# Patient Record
Sex: MALE | Race: WHITE | Employment: OTHER | URBAN - NONMETROPOLITAN AREA
[De-identification: names, ages, dates, MRNs, and addresses within clinical notes are randomized per-mention and may not be internally consistent; named-entity substitution may affect disease eponyms.]

---

## 2020-01-01 ENCOUNTER — HOSPITAL ENCOUNTER (INPATIENT)
Age: 65
LOS: 7 days | Discharge: HOME | DRG: 871 | End: 2020-02-08
Attending: EMERGENCY MEDICINE | Admitting: FAMILY MEDICINE
Payer: MEDICAID

## 2020-01-01 ENCOUNTER — TELEPHONE (OUTPATIENT)
Dept: FAMILY MEDICINE CLINIC | Age: 65
End: 2020-01-01

## 2020-01-01 ENCOUNTER — TELEPHONE (OUTPATIENT)
Dept: CARDIOLOGY | Age: 65
End: 2020-01-01

## 2020-01-01 ENCOUNTER — TELEPHONE (OUTPATIENT)
Dept: ADMINISTRATIVE | Age: 65
End: 2020-01-01

## 2020-01-01 ENCOUNTER — APPOINTMENT (OUTPATIENT)
Dept: GENERAL RADIOLOGY | Age: 65
DRG: 871 | End: 2020-01-01
Payer: MEDICAID

## 2020-01-01 VITALS
RESPIRATION RATE: 21 BRPM | HEIGHT: 73 IN | SYSTOLIC BLOOD PRESSURE: 133 MMHG | DIASTOLIC BLOOD PRESSURE: 79 MMHG | BODY MASS INDEX: 16.33 KG/M2 | TEMPERATURE: 97 F | OXYGEN SATURATION: 99 % | WEIGHT: 123.19 LBS | HEART RATE: 106 BPM

## 2020-01-01 LAB
ALBUMIN SERPL-MCNC: 3.2 G/DL (ref 3.5–5.2)
ALP BLD-CCNC: 125 U/L (ref 40–130)
ALT SERPL-CCNC: 43 U/L (ref 5–41)
ANION GAP SERPL CALCULATED.3IONS-SCNC: 10 MMOL/L (ref 7–19)
ANION GAP SERPL CALCULATED.3IONS-SCNC: 11 MMOL/L (ref 7–19)
ANION GAP SERPL CALCULATED.3IONS-SCNC: 12 MMOL/L (ref 7–19)
ANION GAP SERPL CALCULATED.3IONS-SCNC: 13 MMOL/L (ref 7–19)
ANION GAP SERPL CALCULATED.3IONS-SCNC: 15 MMOL/L (ref 7–19)
ANION GAP SERPL CALCULATED.3IONS-SCNC: 15 MMOL/L (ref 7–19)
ANION GAP SERPL CALCULATED.3IONS-SCNC: 17 MMOL/L (ref 7–19)
ANION GAP SERPL CALCULATED.3IONS-SCNC: 17 MMOL/L (ref 7–19)
ANISOCYTOSIS: ABNORMAL
AST SERPL-CCNC: 29 U/L (ref 5–40)
BANDED NEUTROPHILS RELATIVE PERCENT: 2 % (ref 0–5)
BASE EXCESS ARTERIAL: 3.5 MMOL/L (ref -2–2)
BASE EXCESS ARTERIAL: 4.3 MMOL/L (ref -2–2)
BASOPHILS ABSOLUTE: 0 K/UL (ref 0–0.2)
BASOPHILS ABSOLUTE: 0.2 K/UL (ref 0–0.2)
BASOPHILS RELATIVE PERCENT: 0 % (ref 0–1)
BASOPHILS RELATIVE PERCENT: 0.9 % (ref 0–1)
BILIRUB SERPL-MCNC: 0.8 MG/DL (ref 0.2–1.2)
BILIRUBIN URINE: NEGATIVE
BLOOD CULTURE, ROUTINE: NORMAL
BLOOD CULTURE, ROUTINE: NORMAL
BLOOD, URINE: NEGATIVE
BUN BLDV-MCNC: 12 MG/DL (ref 8–23)
BUN BLDV-MCNC: 13 MG/DL (ref 8–23)
BUN BLDV-MCNC: 17 MG/DL (ref 8–23)
BUN BLDV-MCNC: 19 MG/DL (ref 8–23)
BUN BLDV-MCNC: 20 MG/DL (ref 8–23)
BUN BLDV-MCNC: 21 MG/DL (ref 8–23)
CALCIUM SERPL-MCNC: 8 MG/DL (ref 8.8–10.2)
CALCIUM SERPL-MCNC: 8.3 MG/DL (ref 8.8–10.2)
CALCIUM SERPL-MCNC: 8.4 MG/DL (ref 8.8–10.2)
CALCIUM SERPL-MCNC: 8.5 MG/DL (ref 8.8–10.2)
CALCIUM SERPL-MCNC: 8.8 MG/DL (ref 8.8–10.2)
CALCIUM SERPL-MCNC: 9.2 MG/DL (ref 8.8–10.2)
CARBOXYHEMOGLOBIN ARTERIAL: 2.5 % (ref 0–5)
CARBOXYHEMOGLOBIN ARTERIAL: 2.8 % (ref 0–5)
CHLORIDE BLD-SCNC: 101 MMOL/L (ref 98–111)
CHLORIDE BLD-SCNC: 102 MMOL/L (ref 98–111)
CHLORIDE BLD-SCNC: 103 MMOL/L (ref 98–111)
CHLORIDE BLD-SCNC: 104 MMOL/L (ref 98–111)
CHLORIDE BLD-SCNC: 95 MMOL/L (ref 98–111)
CHLORIDE BLD-SCNC: 95 MMOL/L (ref 98–111)
CLARITY: CLEAR
CO2: 24 MMOL/L (ref 22–29)
CO2: 25 MMOL/L (ref 22–29)
CO2: 27 MMOL/L (ref 22–29)
CO2: 29 MMOL/L (ref 22–29)
CO2: 30 MMOL/L (ref 22–29)
CO2: 31 MMOL/L (ref 22–29)
COLOR: YELLOW
CREAT SERPL-MCNC: 0.5 MG/DL (ref 0.5–1.2)
CREAT SERPL-MCNC: <0.5 MG/DL (ref 0.5–1.2)
CULTURE, BLOOD 2: ABNORMAL
CULTURE, BLOOD 2: ABNORMAL
CULTURE, BLOOD 2: NORMAL
EKG P AXIS: 90 DEGREES
EKG P AXIS: 95 DEGREES
EKG P-R INTERVAL: 158 MS
EKG P-R INTERVAL: 158 MS
EKG Q-T INTERVAL: 298 MS
EKG Q-T INTERVAL: 316 MS
EKG QRS DURATION: 86 MS
EKG QRS DURATION: 90 MS
EKG QTC CALCULATION (BAZETT): 362 MS
EKG QTC CALCULATION (BAZETT): 386 MS
EKG T AXIS: 131 DEGREES
EKG T AXIS: 92 DEGREES
EOSINOPHILS ABSOLUTE: 0 K/UL (ref 0–0.6)
EOSINOPHILS ABSOLUTE: 0 K/UL (ref 0–0.6)
EOSINOPHILS RELATIVE PERCENT: 0 % (ref 0–5)
EOSINOPHILS RELATIVE PERCENT: 0.1 % (ref 0–5)
GFR NON-AFRICAN AMERICAN: >60
GLUCOSE BLD-MCNC: 106 MG/DL (ref 74–109)
GLUCOSE BLD-MCNC: 134 MG/DL (ref 74–109)
GLUCOSE BLD-MCNC: 135 MG/DL (ref 74–109)
GLUCOSE BLD-MCNC: 141 MG/DL (ref 74–109)
GLUCOSE BLD-MCNC: 141 MG/DL (ref 74–109)
GLUCOSE BLD-MCNC: 160 MG/DL (ref 74–109)
GLUCOSE BLD-MCNC: 172 MG/DL (ref 74–109)
GLUCOSE BLD-MCNC: 236 MG/DL (ref 70–99)
GLUCOSE BLD-MCNC: 337 MG/DL (ref 74–109)
GLUCOSE URINE: NEGATIVE MG/DL
HCO3 ARTERIAL: 27.1 MMOL/L (ref 22–26)
HCO3 ARTERIAL: 28.5 MMOL/L (ref 22–26)
HCT VFR BLD CALC: 33.4 % (ref 42–52)
HCT VFR BLD CALC: 33.6 % (ref 42–52)
HCT VFR BLD CALC: 35.1 % (ref 42–52)
HCT VFR BLD CALC: 35.1 % (ref 42–52)
HCT VFR BLD CALC: 35.4 % (ref 42–52)
HCT VFR BLD CALC: 35.7 % (ref 42–52)
HCT VFR BLD CALC: 40.7 % (ref 42–52)
HCT VFR BLD CALC: 41.8 % (ref 42–52)
HEMOGLOBIN, ART, EXTENDED: 12.9 G/DL (ref 14–18)
HEMOGLOBIN, ART, EXTENDED: 14 G/DL (ref 14–18)
HEMOGLOBIN: 10.6 G/DL (ref 14–18)
HEMOGLOBIN: 10.9 G/DL (ref 14–18)
HEMOGLOBIN: 11.1 G/DL (ref 14–18)
HEMOGLOBIN: 11.1 G/DL (ref 14–18)
HEMOGLOBIN: 11.3 G/DL (ref 14–18)
HEMOGLOBIN: 11.3 G/DL (ref 14–18)
HEMOGLOBIN: 12.5 G/DL (ref 14–18)
HEMOGLOBIN: 13.3 G/DL (ref 14–18)
IMMATURE GRANULOCYTES #: 1.9 K/UL
IMMATURE GRANULOCYTES #: 2.3 K/UL
KETONES, URINE: NEGATIVE MG/DL
LACTIC ACID, SEPSIS: 2 MG/DL (ref 0.5–1.9)
LACTIC ACID, SEPSIS: 4.5 MG/DL (ref 0.5–1.9)
LACTIC ACID: 1.8 MMOL/L (ref 0.5–1.9)
LACTIC ACID: 2.2 MMOL/L (ref 0.5–1.9)
LEUKOCYTE ESTERASE, URINE: NEGATIVE
LV EF: 53 %
LVEF MODALITY: NORMAL
LYMPHOCYTES ABSOLUTE: 0.8 K/UL (ref 1.1–4.5)
LYMPHOCYTES ABSOLUTE: 3.4 K/UL (ref 1.1–4.5)
LYMPHOCYTES RELATIVE PERCENT: 14 % (ref 20–40)
LYMPHOCYTES RELATIVE PERCENT: 4.1 % (ref 20–40)
MAGNESIUM: 1.9 MG/DL (ref 1.6–2.4)
MCH RBC QN AUTO: 29.4 PG (ref 27–31)
MCH RBC QN AUTO: 29.5 PG (ref 27–31)
MCH RBC QN AUTO: 29.7 PG (ref 27–31)
MCH RBC QN AUTO: 29.8 PG (ref 27–31)
MCH RBC QN AUTO: 29.9 PG (ref 27–31)
MCH RBC QN AUTO: 30.1 PG (ref 27–31)
MCHC RBC AUTO-ENTMCNC: 30.7 G/DL (ref 33–37)
MCHC RBC AUTO-ENTMCNC: 31.1 G/DL (ref 33–37)
MCHC RBC AUTO-ENTMCNC: 31.6 G/DL (ref 33–37)
MCHC RBC AUTO-ENTMCNC: 31.7 G/DL (ref 33–37)
MCHC RBC AUTO-ENTMCNC: 31.8 G/DL (ref 33–37)
MCHC RBC AUTO-ENTMCNC: 31.9 G/DL (ref 33–37)
MCHC RBC AUTO-ENTMCNC: 32.2 G/DL (ref 33–37)
MCHC RBC AUTO-ENTMCNC: 32.4 G/DL (ref 33–37)
MCV RBC AUTO: 92.3 FL (ref 80–94)
MCV RBC AUTO: 93 FL (ref 80–94)
MCV RBC AUTO: 93.4 FL (ref 80–94)
MCV RBC AUTO: 93.7 FL (ref 80–94)
MCV RBC AUTO: 93.9 FL (ref 80–94)
MCV RBC AUTO: 93.9 FL (ref 80–94)
MCV RBC AUTO: 95.8 FL (ref 80–94)
MCV RBC AUTO: 96 FL (ref 80–94)
METHEMOGLOBIN ARTERIAL: 1.1 %
METHEMOGLOBIN ARTERIAL: 1.6 %
MONOCYTES ABSOLUTE: 0.7 K/UL (ref 0–0.9)
MONOCYTES ABSOLUTE: 1.7 K/UL (ref 0–0.9)
MONOCYTES RELATIVE PERCENT: 3.6 % (ref 0–10)
MONOCYTES RELATIVE PERCENT: 7 % (ref 0–10)
MYELOCYTE PERCENT: 2 %
NEUTROPHILS ABSOLUTE: 16.1 K/UL (ref 1.5–7.5)
NEUTROPHILS ABSOLUTE: 19.2 K/UL (ref 1.5–7.5)
NEUTROPHILS RELATIVE PERCENT: 75 % (ref 50–65)
NEUTROPHILS RELATIVE PERCENT: 81.7 % (ref 50–65)
NITRITE, URINE: NEGATIVE
O2 CONTENT ARTERIAL: 17.7 ML/DL
O2 CONTENT ARTERIAL: 19.2 ML/DL
O2 SAT, ARTERIAL: 96.1 %
O2 SAT, ARTERIAL: 96.3 %
O2 THERAPY: ABNORMAL
O2 THERAPY: ABNORMAL
ORGANISM: ABNORMAL
OVALOCYTES: ABNORMAL
PCO2 ARTERIAL: 34 MMHG (ref 35–45)
PCO2 ARTERIAL: 44 MMHG (ref 35–45)
PDW BLD-RTO: 15.2 % (ref 11.5–14.5)
PDW BLD-RTO: 15.4 % (ref 11.5–14.5)
PDW BLD-RTO: 15.7 % (ref 11.5–14.5)
PDW BLD-RTO: 15.7 % (ref 11.5–14.5)
PDW BLD-RTO: 15.9 % (ref 11.5–14.5)
PDW BLD-RTO: 15.9 % (ref 11.5–14.5)
PDW BLD-RTO: 16.2 % (ref 11.5–14.5)
PDW BLD-RTO: 16.5 % (ref 11.5–14.5)
PERFORMED ON: ABNORMAL
PH ARTERIAL: 7.42 (ref 7.35–7.45)
PH ARTERIAL: 7.51 (ref 7.35–7.45)
PH UA: 6 (ref 5–8)
PLATELET # BLD: 185 K/UL (ref 130–400)
PLATELET # BLD: 206 K/UL (ref 130–400)
PLATELET # BLD: 220 K/UL (ref 130–400)
PLATELET # BLD: 225 K/UL (ref 130–400)
PLATELET # BLD: 233 K/UL (ref 130–400)
PLATELET # BLD: 233 K/UL (ref 130–400)
PLATELET # BLD: 283 K/UL (ref 130–400)
PLATELET # BLD: 360 K/UL (ref 130–400)
PLATELET SLIDE REVIEW: ABNORMAL
PMV BLD AUTO: 10 FL (ref 9.4–12.4)
PMV BLD AUTO: 10 FL (ref 9.4–12.4)
PMV BLD AUTO: 10.1 FL (ref 9.4–12.4)
PMV BLD AUTO: 10.2 FL (ref 9.4–12.4)
PMV BLD AUTO: 9.5 FL (ref 9.4–12.4)
PMV BLD AUTO: 9.9 FL (ref 9.4–12.4)
PO2 ARTERIAL: 123 MMHG (ref 80–100)
PO2 ARTERIAL: 158 MMHG (ref 80–100)
POTASSIUM REFLEX MAGNESIUM: 4.7 MMOL/L (ref 3.5–5)
POTASSIUM REFLEX MAGNESIUM: 4.9 MMOL/L (ref 3.5–5)
POTASSIUM SERPL-SCNC: 2.9 MMOL/L (ref 3.5–5)
POTASSIUM SERPL-SCNC: 3.3 MMOL/L (ref 3.5–5)
POTASSIUM SERPL-SCNC: 3.5 MMOL/L (ref 3.5–5)
POTASSIUM SERPL-SCNC: 3.6 MMOL/L (ref 3.5–5)
POTASSIUM SERPL-SCNC: 3.9 MMOL/L (ref 3.5–5)
POTASSIUM SERPL-SCNC: 4.1 MMOL/L (ref 3.5–5)
POTASSIUM SERPL-SCNC: 4.4 MMOL/L (ref 3.5–5)
POTASSIUM, WHOLE BLOOD: 3.3
POTASSIUM, WHOLE BLOOD: 4.6
PROTEIN UA: NEGATIVE MG/DL
RBC # BLD: 3.59 M/UL (ref 4.7–6.1)
RBC # BLD: 3.64 M/UL (ref 4.7–6.1)
RBC # BLD: 3.72 M/UL (ref 4.7–6.1)
RBC # BLD: 3.74 M/UL (ref 4.7–6.1)
RBC # BLD: 3.76 M/UL (ref 4.7–6.1)
RBC # BLD: 3.78 M/UL (ref 4.7–6.1)
RBC # BLD: 4.25 M/UL (ref 4.7–6.1)
RBC # BLD: 4.45 M/UL (ref 4.7–6.1)
SODIUM BLD-SCNC: 135 MMOL/L (ref 136–145)
SODIUM BLD-SCNC: 139 MMOL/L (ref 136–145)
SODIUM BLD-SCNC: 141 MMOL/L (ref 136–145)
SODIUM BLD-SCNC: 143 MMOL/L (ref 136–145)
SODIUM BLD-SCNC: 145 MMOL/L (ref 136–145)
SPECIFIC GRAVITY UA: 1.02 (ref 1–1.03)
TOTAL PROTEIN: 6.7 G/DL (ref 6.6–8.7)
TROPONIN: <0.01 NG/ML (ref 0–0.03)
UROBILINOGEN, URINE: 4 E.U./DL
WBC # BLD: 17 K/UL (ref 4.8–10.8)
WBC # BLD: 17.6 K/UL (ref 4.8–10.8)
WBC # BLD: 17.9 K/UL (ref 4.8–10.8)
WBC # BLD: 18.1 K/UL (ref 4.8–10.8)
WBC # BLD: 19.8 K/UL (ref 4.8–10.8)
WBC # BLD: 19.9 K/UL (ref 4.8–10.8)
WBC # BLD: 20.1 K/UL (ref 4.8–10.8)
WBC # BLD: 24.3 K/UL (ref 4.8–10.8)

## 2020-01-01 PROCEDURE — 84132 ASSAY OF SERUM POTASSIUM: CPT

## 2020-01-01 PROCEDURE — 6370000000 HC RX 637 (ALT 250 FOR IP): Performed by: NURSE PRACTITIONER

## 2020-01-01 PROCEDURE — 2700000000 HC OXYGEN THERAPY PER DAY

## 2020-01-01 PROCEDURE — 2500000003 HC RX 250 WO HCPCS: Performed by: HOSPITALIST

## 2020-01-01 PROCEDURE — 6360000002 HC RX W HCPCS: Performed by: INTERNAL MEDICINE

## 2020-01-01 PROCEDURE — 82803 BLOOD GASES ANY COMBINATION: CPT

## 2020-01-01 PROCEDURE — 94640 AIRWAY INHALATION TREATMENT: CPT

## 2020-01-01 PROCEDURE — 6360000002 HC RX W HCPCS: Performed by: HOSPITALIST

## 2020-01-01 PROCEDURE — 99999 PR OFFICE/OUTPT VISIT,PROCEDURE ONLY: CPT | Performed by: EMERGENCY MEDICINE

## 2020-01-01 PROCEDURE — 6370000000 HC RX 637 (ALT 250 FOR IP): Performed by: FAMILY MEDICINE

## 2020-01-01 PROCEDURE — 6360000002 HC RX W HCPCS: Performed by: NURSE PRACTITIONER

## 2020-01-01 PROCEDURE — 6370000000 HC RX 637 (ALT 250 FOR IP): Performed by: INTERNAL MEDICINE

## 2020-01-01 PROCEDURE — 2580000003 HC RX 258: Performed by: INTERNAL MEDICINE

## 2020-01-01 PROCEDURE — 93005 ELECTROCARDIOGRAM TRACING: CPT | Performed by: EMERGENCY MEDICINE

## 2020-01-01 PROCEDURE — 85025 COMPLETE CBC W/AUTO DIFF WBC: CPT

## 2020-01-01 PROCEDURE — 6370000000 HC RX 637 (ALT 250 FOR IP): Performed by: HOSPITALIST

## 2020-01-01 PROCEDURE — 2580000003 HC RX 258: Performed by: HOSPITALIST

## 2020-01-01 PROCEDURE — 6370000000 HC RX 637 (ALT 250 FOR IP): Performed by: EMERGENCY MEDICINE

## 2020-01-01 PROCEDURE — 82948 REAGENT STRIP/BLOOD GLUCOSE: CPT

## 2020-01-01 PROCEDURE — 97530 THERAPEUTIC ACTIVITIES: CPT

## 2020-01-01 PROCEDURE — 93005 ELECTROCARDIOGRAM TRACING: CPT | Performed by: HOSPITALIST

## 2020-01-01 PROCEDURE — 36600 WITHDRAWAL OF ARTERIAL BLOOD: CPT

## 2020-01-01 PROCEDURE — 93010 ELECTROCARDIOGRAM REPORT: CPT | Performed by: INTERNAL MEDICINE

## 2020-01-01 PROCEDURE — 1210000000 HC MED SURG R&B

## 2020-01-01 PROCEDURE — 36415 COLL VENOUS BLD VENIPUNCTURE: CPT

## 2020-01-01 PROCEDURE — 87040 BLOOD CULTURE FOR BACTERIA: CPT

## 2020-01-01 PROCEDURE — 2580000003 HC RX 258: Performed by: EMERGENCY MEDICINE

## 2020-01-01 PROCEDURE — 97162 PT EVAL MOD COMPLEX 30 MIN: CPT

## 2020-01-01 PROCEDURE — 80048 BASIC METABOLIC PNL TOTAL CA: CPT

## 2020-01-01 PROCEDURE — 6360000002 HC RX W HCPCS: Performed by: EMERGENCY MEDICINE

## 2020-01-01 PROCEDURE — 99285 EMERGENCY DEPT VISIT HI MDM: CPT

## 2020-01-01 PROCEDURE — 85027 COMPLETE CBC AUTOMATED: CPT

## 2020-01-01 PROCEDURE — 93306 TTE W/DOPPLER COMPLETE: CPT

## 2020-01-01 PROCEDURE — 81003 URINALYSIS AUTO W/O SCOPE: CPT

## 2020-01-01 PROCEDURE — 84484 ASSAY OF TROPONIN QUANT: CPT

## 2020-01-01 PROCEDURE — 83605 ASSAY OF LACTIC ACID: CPT

## 2020-01-01 PROCEDURE — 99223 1ST HOSP IP/OBS HIGH 75: CPT | Performed by: INTERNAL MEDICINE

## 2020-01-01 PROCEDURE — 71045 X-RAY EXAM CHEST 1 VIEW: CPT

## 2020-01-01 PROCEDURE — 80053 COMPREHEN METABOLIC PANEL: CPT

## 2020-01-01 PROCEDURE — 96374 THER/PROPH/DIAG INJ IV PUSH: CPT

## 2020-01-01 PROCEDURE — 96375 TX/PRO/DX INJ NEW DRUG ADDON: CPT

## 2020-01-01 PROCEDURE — 99232 SBSQ HOSP IP/OBS MODERATE 35: CPT | Performed by: INTERNAL MEDICINE

## 2020-01-01 PROCEDURE — 99233 SBSQ HOSP IP/OBS HIGH 50: CPT | Performed by: INTERNAL MEDICINE

## 2020-01-01 PROCEDURE — 83735 ASSAY OF MAGNESIUM: CPT

## 2020-01-01 RX ORDER — POTASSIUM CHLORIDE 20 MEQ/1
40 TABLET, EXTENDED RELEASE ORAL PRN
Status: DISCONTINUED | OUTPATIENT
Start: 2020-01-01 | End: 2020-01-01 | Stop reason: HOSPADM

## 2020-01-01 RX ORDER — MIDAZOLAM HYDROCHLORIDE 1 MG/ML
INJECTION INTRAMUSCULAR; INTRAVENOUS
Status: DISPENSED
Start: 2020-01-01 | End: 2020-01-01

## 2020-01-01 RX ORDER — OMEPRAZOLE 20 MG/1
CAPSULE, DELAYED RELEASE ORAL
COMMUNITY

## 2020-01-01 RX ORDER — SODIUM CHLORIDE 0.9 % (FLUSH) 0.9 %
10 SYRINGE (ML) INJECTION PRN
Status: DISCONTINUED | OUTPATIENT
Start: 2020-01-01 | End: 2020-01-01 | Stop reason: HOSPADM

## 2020-01-01 RX ORDER — PREDNISONE 20 MG/1
TABLET ORAL
Qty: 35 TABLET | Refills: 0 | Status: SHIPPED | OUTPATIENT
Start: 2020-01-01 | End: 2020-01-01

## 2020-01-01 RX ORDER — LISINOPRIL 10 MG/1
10 TABLET ORAL DAILY
Qty: 30 TABLET | Refills: 0 | Status: SHIPPED | OUTPATIENT
Start: 2020-01-01

## 2020-01-01 RX ORDER — TRAMADOL HYDROCHLORIDE 50 MG/1
50 TABLET ORAL EVERY 6 HOURS PRN
Qty: 12 TABLET | Refills: 0 | Status: SHIPPED | OUTPATIENT
Start: 2020-01-01 | End: 2020-01-01

## 2020-01-01 RX ORDER — IPRATROPIUM BROMIDE AND ALBUTEROL SULFATE 2.5; .5 MG/3ML; MG/3ML
1 SOLUTION RESPIRATORY (INHALATION) ONCE
Status: COMPLETED | OUTPATIENT
Start: 2020-01-01 | End: 2020-01-01

## 2020-01-01 RX ORDER — METHYLPREDNISOLONE SODIUM SUCCINATE 125 MG/2ML
80 INJECTION, POWDER, LYOPHILIZED, FOR SOLUTION INTRAMUSCULAR; INTRAVENOUS EVERY 8 HOURS
Status: DISCONTINUED | OUTPATIENT
Start: 2020-01-01 | End: 2020-01-01

## 2020-01-01 RX ORDER — DOCUSATE SODIUM 100 MG/1
100 CAPSULE, LIQUID FILLED ORAL DAILY
Status: DISCONTINUED | OUTPATIENT
Start: 2020-01-01 | End: 2020-01-01 | Stop reason: HOSPADM

## 2020-01-01 RX ORDER — METHYLPREDNISOLONE SODIUM SUCCINATE 40 MG/ML
40 INJECTION, POWDER, LYOPHILIZED, FOR SOLUTION INTRAMUSCULAR; INTRAVENOUS EVERY 12 HOURS
Status: DISCONTINUED | OUTPATIENT
Start: 2020-01-01 | End: 2020-01-01 | Stop reason: HOSPADM

## 2020-01-01 RX ORDER — SODIUM CHLORIDE 0.9 % (FLUSH) 0.9 %
10 SYRINGE (ML) INJECTION PRN
Status: DISCONTINUED | OUTPATIENT
Start: 2020-01-01 | End: 2020-01-01

## 2020-01-01 RX ORDER — DILTIAZEM HYDROCHLORIDE 300 MG/1
CAPSULE, COATED, EXTENDED RELEASE ORAL
Status: ON HOLD | COMMUNITY
End: 2020-01-01 | Stop reason: HOSPADM

## 2020-01-01 RX ORDER — PANTOPRAZOLE SODIUM 40 MG/1
40 TABLET, DELAYED RELEASE ORAL
Status: DISCONTINUED | OUTPATIENT
Start: 2020-01-01 | End: 2020-01-01 | Stop reason: HOSPADM

## 2020-01-01 RX ORDER — TRAMADOL HYDROCHLORIDE 50 MG/1
50 TABLET ORAL EVERY 6 HOURS PRN
Status: DISCONTINUED | OUTPATIENT
Start: 2020-01-01 | End: 2020-01-01 | Stop reason: HOSPADM

## 2020-01-01 RX ORDER — CHLORPHENIRAMINE MALEATE 4 MG/1
TABLET ORAL
Status: ON HOLD | COMMUNITY
End: 2020-01-01 | Stop reason: HOSPADM

## 2020-01-01 RX ORDER — POTASSIUM CHLORIDE 7.45 MG/ML
10 INJECTION INTRAVENOUS PRN
Status: DISCONTINUED | OUTPATIENT
Start: 2020-01-01 | End: 2020-01-01 | Stop reason: HOSPADM

## 2020-01-01 RX ORDER — TRAMADOL HYDROCHLORIDE 50 MG/1
50 TABLET ORAL ONCE
Status: COMPLETED | OUTPATIENT
Start: 2020-01-01 | End: 2020-01-01

## 2020-01-01 RX ORDER — PREDNISONE 20 MG/1
40 TABLET ORAL DAILY
Status: DISCONTINUED | OUTPATIENT
Start: 2020-01-01 | End: 2020-01-01

## 2020-01-01 RX ORDER — HYDROXYZINE HYDROCHLORIDE 25 MG/1
25 TABLET, FILM COATED ORAL 3 TIMES DAILY
Qty: 90 TABLET | Refills: 0 | Status: SHIPPED | OUTPATIENT
Start: 2020-01-01 | End: 2020-01-01

## 2020-01-01 RX ORDER — MONTELUKAST SODIUM 10 MG/1
10 TABLET ORAL NIGHTLY
Status: DISCONTINUED | OUTPATIENT
Start: 2020-01-01 | End: 2020-01-01 | Stop reason: HOSPADM

## 2020-01-01 RX ORDER — HYDROXYZINE HYDROCHLORIDE 25 MG/1
25 TABLET, FILM COATED ORAL 3 TIMES DAILY
Status: DISCONTINUED | OUTPATIENT
Start: 2020-01-01 | End: 2020-01-01 | Stop reason: HOSPADM

## 2020-01-01 RX ORDER — IPRATROPIUM BROMIDE AND ALBUTEROL SULFATE 2.5; .5 MG/3ML; MG/3ML
1 SOLUTION RESPIRATORY (INHALATION)
Status: DISCONTINUED | OUTPATIENT
Start: 2020-01-01 | End: 2020-01-01 | Stop reason: HOSPADM

## 2020-01-01 RX ORDER — ONDANSETRON 2 MG/ML
4 INJECTION INTRAMUSCULAR; INTRAVENOUS EVERY 6 HOURS PRN
Status: DISCONTINUED | OUTPATIENT
Start: 2020-01-01 | End: 2020-01-01 | Stop reason: HOSPADM

## 2020-01-01 RX ORDER — LORAZEPAM 2 MG/ML
0.5 INJECTION INTRAMUSCULAR EVERY 6 HOURS PRN
Status: DISCONTINUED | OUTPATIENT
Start: 2020-01-01 | End: 2020-01-01 | Stop reason: HOSPADM

## 2020-01-01 RX ORDER — SODIUM CHLORIDE 0.9 % (FLUSH) 0.9 %
10 SYRINGE (ML) INJECTION EVERY 12 HOURS SCHEDULED
Status: DISCONTINUED | OUTPATIENT
Start: 2020-01-01 | End: 2020-01-01

## 2020-01-01 RX ORDER — METHYLPREDNISOLONE SODIUM SUCCINATE 125 MG/2ML
80 INJECTION, POWDER, LYOPHILIZED, FOR SOLUTION INTRAMUSCULAR; INTRAVENOUS ONCE
Status: COMPLETED | OUTPATIENT
Start: 2020-01-01 | End: 2020-01-01

## 2020-01-01 RX ORDER — LISINOPRIL 10 MG/1
10 TABLET ORAL DAILY
Status: DISCONTINUED | OUTPATIENT
Start: 2020-01-01 | End: 2020-01-01 | Stop reason: HOSPADM

## 2020-01-01 RX ORDER — IPRATROPIUM BROMIDE AND ALBUTEROL SULFATE 2.5; .5 MG/3ML; MG/3ML
1 SOLUTION RESPIRATORY (INHALATION) EVERY 4 HOURS PRN
Status: DISCONTINUED | OUTPATIENT
Start: 2020-01-01 | End: 2020-01-01 | Stop reason: HOSPADM

## 2020-01-01 RX ORDER — MONTELUKAST SODIUM 10 MG/1
TABLET ORAL
COMMUNITY

## 2020-01-01 RX ORDER — KETOROLAC TROMETHAMINE 30 MG/ML
30 INJECTION, SOLUTION INTRAMUSCULAR; INTRAVENOUS EVERY 6 HOURS PRN
Status: DISPENSED | OUTPATIENT
Start: 2020-01-01 | End: 2020-01-01

## 2020-01-01 RX ORDER — ACETAMINOPHEN 325 MG/1
650 TABLET ORAL EVERY 6 HOURS PRN
Status: DISCONTINUED | OUTPATIENT
Start: 2020-01-01 | End: 2020-01-01

## 2020-01-01 RX ORDER — GUAIFENESIN AND DEXTROMETHORPHAN HYDROBROMIDE 1200; 60 MG/1; MG/1
TABLET, EXTENDED RELEASE ORAL
Status: ON HOLD | COMMUNITY
End: 2020-01-01 | Stop reason: HOSPADM

## 2020-01-01 RX ORDER — DOCUSATE SODIUM 100 MG/1
100 CAPSULE, LIQUID FILLED ORAL DAILY
COMMUNITY

## 2020-01-01 RX ORDER — IPRATROPIUM BROMIDE AND ALBUTEROL SULFATE 2.5; .5 MG/3ML; MG/3ML
SOLUTION RESPIRATORY (INHALATION)
COMMUNITY

## 2020-01-01 RX ORDER — SODIUM CHLORIDE 0.9 % (FLUSH) 0.9 %
10 SYRINGE (ML) INJECTION EVERY 12 HOURS SCHEDULED
Status: DISCONTINUED | OUTPATIENT
Start: 2020-01-01 | End: 2020-01-01 | Stop reason: HOSPADM

## 2020-01-01 RX ORDER — SODIUM CHLORIDE 9 MG/ML
INJECTION, SOLUTION INTRAVENOUS CONTINUOUS
Status: DISCONTINUED | OUTPATIENT
Start: 2020-01-01 | End: 2020-01-01 | Stop reason: HOSPADM

## 2020-01-01 RX ORDER — DOXYCYCLINE HYCLATE 100 MG
100 TABLET ORAL 2 TIMES DAILY
Qty: 10 TABLET | Refills: 0 | Status: SHIPPED | OUTPATIENT
Start: 2020-01-01 | End: 2020-01-01

## 2020-01-01 RX ORDER — METHYLPREDNISOLONE SODIUM SUCCINATE 125 MG/2ML
60 INJECTION, POWDER, LYOPHILIZED, FOR SOLUTION INTRAMUSCULAR; INTRAVENOUS EVERY 8 HOURS
Status: DISCONTINUED | OUTPATIENT
Start: 2020-01-01 | End: 2020-01-01

## 2020-01-01 RX ORDER — LANOLIN ALCOHOL/MO/W.PET/CERES
3 CREAM (GRAM) TOPICAL NIGHTLY PRN
Status: DISCONTINUED | OUTPATIENT
Start: 2020-01-01 | End: 2020-01-01 | Stop reason: HOSPADM

## 2020-01-01 RX ORDER — PREDNISONE 10 MG/1
TABLET ORAL
Status: ON HOLD | COMMUNITY
End: 2020-01-01 | Stop reason: HOSPADM

## 2020-01-01 RX ORDER — ACETAMINOPHEN 325 MG/1
650 TABLET ORAL EVERY 6 HOURS PRN
Status: DISCONTINUED | OUTPATIENT
Start: 2020-01-01 | End: 2020-01-01 | Stop reason: HOSPADM

## 2020-01-01 RX ADMIN — KETOROLAC TROMETHAMINE 30 MG: 30 INJECTION, SOLUTION INTRAMUSCULAR at 04:18

## 2020-01-01 RX ADMIN — DOCUSATE SODIUM 100 MG: 100 CAPSULE, LIQUID FILLED ORAL at 08:05

## 2020-01-01 RX ADMIN — IPRATROPIUM BROMIDE AND ALBUTEROL SULFATE 1 AMPULE: 2.5; .5 SOLUTION RESPIRATORY (INHALATION) at 00:15

## 2020-01-01 RX ADMIN — SODIUM CHLORIDE, PRESERVATIVE FREE 10 ML: 5 INJECTION INTRAVENOUS at 21:47

## 2020-01-01 RX ADMIN — SODIUM CHLORIDE, PRESERVATIVE FREE 10 ML: 5 INJECTION INTRAVENOUS at 09:47

## 2020-01-01 RX ADMIN — ENOXAPARIN SODIUM 40 MG: 40 INJECTION SUBCUTANEOUS at 08:25

## 2020-01-01 RX ADMIN — POTASSIUM CHLORIDE 10 MEQ: 7.46 INJECTION, SOLUTION INTRAVENOUS at 06:37

## 2020-01-01 RX ADMIN — KETOROLAC TROMETHAMINE 30 MG: 30 INJECTION, SOLUTION INTRAMUSCULAR at 09:28

## 2020-01-01 RX ADMIN — ENOXAPARIN SODIUM 40 MG: 40 INJECTION SUBCUTANEOUS at 09:48

## 2020-01-01 RX ADMIN — PANTOPRAZOLE SODIUM 40 MG: 40 TABLET, DELAYED RELEASE ORAL at 07:39

## 2020-01-01 RX ADMIN — SODIUM CHLORIDE, PRESERVATIVE FREE 10 ML: 5 INJECTION INTRAVENOUS at 08:26

## 2020-01-01 RX ADMIN — PANTOPRAZOLE SODIUM 40 MG: 40 TABLET, DELAYED RELEASE ORAL at 06:30

## 2020-01-01 RX ADMIN — METHYLPREDNISOLONE SODIUM SUCCINATE 40 MG: 40 INJECTION, POWDER, FOR SOLUTION INTRAMUSCULAR; INTRAVENOUS at 04:04

## 2020-01-01 RX ADMIN — IPRATROPIUM BROMIDE AND ALBUTEROL SULFATE 1 AMPULE: .5; 3 SOLUTION RESPIRATORY (INHALATION) at 19:17

## 2020-01-01 RX ADMIN — MONTELUKAST SODIUM 10 MG: 10 TABLET, FILM COATED ORAL at 21:57

## 2020-01-01 RX ADMIN — DOXYCYCLINE 100 MG: 100 INJECTION, POWDER, LYOPHILIZED, FOR SOLUTION INTRAVENOUS at 21:57

## 2020-01-01 RX ADMIN — SODIUM CHLORIDE: 9 INJECTION, SOLUTION INTRAVENOUS at 14:26

## 2020-01-01 RX ADMIN — METHYLPREDNISOLONE SODIUM SUCCINATE 40 MG: 40 INJECTION, POWDER, FOR SOLUTION INTRAMUSCULAR; INTRAVENOUS at 16:50

## 2020-01-01 RX ADMIN — DOXYCYCLINE 100 MG: 100 INJECTION, POWDER, LYOPHILIZED, FOR SOLUTION INTRAVENOUS at 22:16

## 2020-01-01 RX ADMIN — IPRATROPIUM BROMIDE AND ALBUTEROL SULFATE 1 AMPULE: .5; 3 SOLUTION RESPIRATORY (INHALATION) at 14:47

## 2020-01-01 RX ADMIN — IPRATROPIUM BROMIDE AND ALBUTEROL SULFATE 1 AMPULE: .5; 3 SOLUTION RESPIRATORY (INHALATION) at 15:32

## 2020-01-01 RX ADMIN — SODIUM CHLORIDE, PRESERVATIVE FREE 10 ML: 5 INJECTION INTRAVENOUS at 09:59

## 2020-01-01 RX ADMIN — SODIUM CHLORIDE, PRESERVATIVE FREE 10 ML: 5 INJECTION INTRAVENOUS at 19:28

## 2020-01-01 RX ADMIN — DOCUSATE SODIUM 100 MG: 100 CAPSULE, LIQUID FILLED ORAL at 09:08

## 2020-01-01 RX ADMIN — DOCUSATE SODIUM 100 MG: 100 CAPSULE, LIQUID FILLED ORAL at 08:45

## 2020-01-01 RX ADMIN — LISINOPRIL 10 MG: 10 TABLET ORAL at 10:28

## 2020-01-01 RX ADMIN — KETOROLAC TROMETHAMINE 30 MG: 30 INJECTION, SOLUTION INTRAMUSCULAR at 22:56

## 2020-01-01 RX ADMIN — DOXYCYCLINE 100 MG: 100 INJECTION, POWDER, LYOPHILIZED, FOR SOLUTION INTRAVENOUS at 09:47

## 2020-01-01 RX ADMIN — DOCUSATE SODIUM 100 MG: 100 CAPSULE, LIQUID FILLED ORAL at 08:25

## 2020-01-01 RX ADMIN — POTASSIUM CHLORIDE 10 MEQ: 7.46 INJECTION, SOLUTION INTRAVENOUS at 17:16

## 2020-01-01 RX ADMIN — IPRATROPIUM BROMIDE AND ALBUTEROL SULFATE 1 AMPULE: .5; 3 SOLUTION RESPIRATORY (INHALATION) at 06:29

## 2020-01-01 RX ADMIN — DOCUSATE SODIUM 100 MG: 100 CAPSULE, LIQUID FILLED ORAL at 09:31

## 2020-01-01 RX ADMIN — HYDROXYZINE HYDROCHLORIDE 25 MG: 10 TABLET, FILM COATED ORAL at 15:00

## 2020-01-01 RX ADMIN — METHYLPREDNISOLONE SODIUM SUCCINATE 60 MG: 125 INJECTION, POWDER, FOR SOLUTION INTRAMUSCULAR; INTRAVENOUS at 15:04

## 2020-01-01 RX ADMIN — SODIUM CHLORIDE, PRESERVATIVE FREE 10 ML: 5 INJECTION INTRAVENOUS at 22:49

## 2020-01-01 RX ADMIN — KETOROLAC TROMETHAMINE 30 MG: 30 INJECTION, SOLUTION INTRAMUSCULAR at 13:43

## 2020-01-01 RX ADMIN — HYDROXYZINE HYDROCHLORIDE 25 MG: 10 TABLET, FILM COATED ORAL at 16:50

## 2020-01-01 RX ADMIN — IPRATROPIUM BROMIDE AND ALBUTEROL SULFATE 1 AMPULE: .5; 3 SOLUTION RESPIRATORY (INHALATION) at 06:45

## 2020-01-01 RX ADMIN — DOXYCYCLINE 100 MG: 100 INJECTION, POWDER, LYOPHILIZED, FOR SOLUTION INTRAVENOUS at 10:25

## 2020-01-01 RX ADMIN — KETOROLAC TROMETHAMINE 30 MG: 30 INJECTION, SOLUTION INTRAMUSCULAR at 19:28

## 2020-01-01 RX ADMIN — METHYLPREDNISOLONE SODIUM SUCCINATE 40 MG: 40 INJECTION, POWDER, FOR SOLUTION INTRAMUSCULAR; INTRAVENOUS at 04:11

## 2020-01-01 RX ADMIN — DOXYCYCLINE 100 MG: 100 INJECTION, POWDER, LYOPHILIZED, FOR SOLUTION INTRAVENOUS at 10:31

## 2020-01-01 RX ADMIN — HYDROXYZINE HYDROCHLORIDE 25 MG: 10 TABLET, FILM COATED ORAL at 10:29

## 2020-01-01 RX ADMIN — LISINOPRIL 10 MG: 10 TABLET ORAL at 09:08

## 2020-01-01 RX ADMIN — MELATONIN 3 MG: at 22:15

## 2020-01-01 RX ADMIN — LISINOPRIL 10 MG: 10 TABLET ORAL at 08:05

## 2020-01-01 RX ADMIN — SODIUM CHLORIDE: 9 INJECTION, SOLUTION INTRAVENOUS at 09:06

## 2020-01-01 RX ADMIN — IPRATROPIUM BROMIDE AND ALBUTEROL SULFATE 1 AMPULE: .5; 3 SOLUTION RESPIRATORY (INHALATION) at 15:24

## 2020-01-01 RX ADMIN — HYDROXYZINE HYDROCHLORIDE 25 MG: 10 TABLET, FILM COATED ORAL at 09:47

## 2020-01-01 RX ADMIN — HYDROXYZINE HYDROCHLORIDE 25 MG: 10 TABLET, FILM COATED ORAL at 22:17

## 2020-01-01 RX ADMIN — PANTOPRAZOLE SODIUM 40 MG: 40 TABLET, DELAYED RELEASE ORAL at 06:09

## 2020-01-01 RX ADMIN — SODIUM CHLORIDE, PRESERVATIVE FREE 10 ML: 5 INJECTION INTRAVENOUS at 17:18

## 2020-01-01 RX ADMIN — METHYLPREDNISOLONE SODIUM SUCCINATE 40 MG: 40 INJECTION, POWDER, FOR SOLUTION INTRAMUSCULAR; INTRAVENOUS at 15:10

## 2020-01-01 RX ADMIN — IPRATROPIUM BROMIDE AND ALBUTEROL SULFATE 1 AMPULE: .5; 3 SOLUTION RESPIRATORY (INHALATION) at 06:40

## 2020-01-01 RX ADMIN — IPRATROPIUM BROMIDE AND ALBUTEROL SULFATE 1 AMPULE: .5; 3 SOLUTION RESPIRATORY (INHALATION) at 12:17

## 2020-01-01 RX ADMIN — MONTELUKAST SODIUM 10 MG: 10 TABLET, FILM COATED ORAL at 21:39

## 2020-01-01 RX ADMIN — IPRATROPIUM BROMIDE AND ALBUTEROL SULFATE 1 AMPULE: .5; 3 SOLUTION RESPIRATORY (INHALATION) at 18:35

## 2020-01-01 RX ADMIN — KETOROLAC TROMETHAMINE 30 MG: 30 INJECTION, SOLUTION INTRAMUSCULAR at 15:05

## 2020-01-01 RX ADMIN — KETOROLAC TROMETHAMINE 30 MG: 30 INJECTION, SOLUTION INTRAMUSCULAR at 13:07

## 2020-01-01 RX ADMIN — IPRATROPIUM BROMIDE AND ALBUTEROL SULFATE 1 AMPULE: .5; 3 SOLUTION RESPIRATORY (INHALATION) at 18:40

## 2020-01-01 RX ADMIN — HYDROXYZINE HYDROCHLORIDE 25 MG: 10 TABLET, FILM COATED ORAL at 15:04

## 2020-01-01 RX ADMIN — IPRATROPIUM BROMIDE AND ALBUTEROL SULFATE 1 AMPULE: .5; 3 SOLUTION RESPIRATORY (INHALATION) at 14:32

## 2020-01-01 RX ADMIN — IPRATROPIUM BROMIDE AND ALBUTEROL SULFATE 1 AMPULE: .5; 3 SOLUTION RESPIRATORY (INHALATION) at 11:25

## 2020-01-01 RX ADMIN — ENOXAPARIN SODIUM 40 MG: 40 INJECTION SUBCUTANEOUS at 08:05

## 2020-01-01 RX ADMIN — HYDROXYZINE HYDROCHLORIDE 25 MG: 10 TABLET, FILM COATED ORAL at 09:59

## 2020-01-01 RX ADMIN — HYDROXYZINE HYDROCHLORIDE 25 MG: 10 TABLET, FILM COATED ORAL at 19:27

## 2020-01-01 RX ADMIN — PANTOPRAZOLE SODIUM 40 MG: 40 TABLET, DELAYED RELEASE ORAL at 06:19

## 2020-01-01 RX ADMIN — METHYLPREDNISOLONE SODIUM SUCCINATE 40 MG: 40 INJECTION, POWDER, FOR SOLUTION INTRAMUSCULAR; INTRAVENOUS at 04:23

## 2020-01-01 RX ADMIN — MONTELUKAST SODIUM 10 MG: 10 TABLET, FILM COATED ORAL at 19:29

## 2020-01-01 RX ADMIN — METHYLPREDNISOLONE SODIUM SUCCINATE 40 MG: 40 INJECTION, POWDER, FOR SOLUTION INTRAMUSCULAR; INTRAVENOUS at 14:44

## 2020-01-01 RX ADMIN — DILTIAZEM HYDROCHLORIDE 300 MG: 180 CAPSULE, COATED, EXTENDED RELEASE ORAL at 09:32

## 2020-01-01 RX ADMIN — CEFTRIAXONE 1 G: 1 INJECTION, POWDER, FOR SOLUTION INTRAMUSCULAR; INTRAVENOUS at 01:30

## 2020-01-01 RX ADMIN — METHYLPREDNISOLONE SODIUM SUCCINATE 40 MG: 40 INJECTION, POWDER, FOR SOLUTION INTRAMUSCULAR; INTRAVENOUS at 16:20

## 2020-01-01 RX ADMIN — IPRATROPIUM BROMIDE AND ALBUTEROL SULFATE 1 AMPULE: .5; 3 SOLUTION RESPIRATORY (INHALATION) at 08:24

## 2020-01-01 RX ADMIN — KETOROLAC TROMETHAMINE 30 MG: 30 INJECTION, SOLUTION INTRAMUSCULAR at 21:59

## 2020-01-01 RX ADMIN — SODIUM CHLORIDE, PRESERVATIVE FREE 10 ML: 5 INJECTION INTRAVENOUS at 08:05

## 2020-01-01 RX ADMIN — DOCUSATE SODIUM 100 MG: 100 CAPSULE, LIQUID FILLED ORAL at 10:28

## 2020-01-01 RX ADMIN — IPRATROPIUM BROMIDE AND ALBUTEROL SULFATE 1 AMPULE: .5; 3 SOLUTION RESPIRATORY (INHALATION) at 19:15

## 2020-01-01 RX ADMIN — PANTOPRAZOLE SODIUM 40 MG: 40 TABLET, DELAYED RELEASE ORAL at 05:19

## 2020-01-01 RX ADMIN — HYDROXYZINE HYDROCHLORIDE 25 MG: 10 TABLET, FILM COATED ORAL at 09:32

## 2020-01-01 RX ADMIN — HYDROXYZINE HYDROCHLORIDE 25 MG: 10 TABLET, FILM COATED ORAL at 15:10

## 2020-01-01 RX ADMIN — POTASSIUM CHLORIDE 10 MEQ: 7.46 INJECTION, SOLUTION INTRAVENOUS at 14:00

## 2020-01-01 RX ADMIN — HYDROXYZINE HYDROCHLORIDE 25 MG: 10 TABLET, FILM COATED ORAL at 21:39

## 2020-01-01 RX ADMIN — DOXYCYCLINE 100 MG: 100 INJECTION, POWDER, LYOPHILIZED, FOR SOLUTION INTRAVENOUS at 22:18

## 2020-01-01 RX ADMIN — LISINOPRIL 10 MG: 10 TABLET ORAL at 17:50

## 2020-01-01 RX ADMIN — IPRATROPIUM BROMIDE AND ALBUTEROL SULFATE 1 AMPULE: .5; 3 SOLUTION RESPIRATORY (INHALATION) at 07:03

## 2020-01-01 RX ADMIN — DOXYCYCLINE 100 MG: 100 INJECTION, POWDER, LYOPHILIZED, FOR SOLUTION INTRAVENOUS at 09:06

## 2020-01-01 RX ADMIN — MONTELUKAST SODIUM 10 MG: 10 TABLET, FILM COATED ORAL at 21:51

## 2020-01-01 RX ADMIN — IPRATROPIUM BROMIDE AND ALBUTEROL SULFATE 1 AMPULE: .5; 3 SOLUTION RESPIRATORY (INHALATION) at 06:26

## 2020-01-01 RX ADMIN — PANTOPRAZOLE SODIUM 40 MG: 40 TABLET, DELAYED RELEASE ORAL at 06:17

## 2020-01-01 RX ADMIN — MONTELUKAST SODIUM 10 MG: 10 TABLET, FILM COATED ORAL at 21:47

## 2020-01-01 RX ADMIN — DILTIAZEM HYDROCHLORIDE 300 MG: 180 CAPSULE, COATED, EXTENDED RELEASE ORAL at 09:59

## 2020-01-01 RX ADMIN — METHYLPREDNISOLONE SODIUM SUCCINATE 40 MG: 40 INJECTION, POWDER, FOR SOLUTION INTRAMUSCULAR; INTRAVENOUS at 06:19

## 2020-01-01 RX ADMIN — ENOXAPARIN SODIUM 40 MG: 40 INJECTION SUBCUTANEOUS at 09:32

## 2020-01-01 RX ADMIN — HYDROXYZINE HYDROCHLORIDE 25 MG: 10 TABLET, FILM COATED ORAL at 12:45

## 2020-01-01 RX ADMIN — METHYLPREDNISOLONE SODIUM SUCCINATE 40 MG: 40 INJECTION, POWDER, FOR SOLUTION INTRAMUSCULAR; INTRAVENOUS at 14:53

## 2020-01-01 RX ADMIN — SODIUM CHLORIDE: 9 INJECTION, SOLUTION INTRAVENOUS at 11:53

## 2020-01-01 RX ADMIN — LORAZEPAM 0.5 MG: 2 INJECTION INTRAMUSCULAR; INTRAVENOUS at 17:18

## 2020-01-01 RX ADMIN — IPRATROPIUM BROMIDE AND ALBUTEROL SULFATE 1 AMPULE: .5; 3 SOLUTION RESPIRATORY (INHALATION) at 11:29

## 2020-01-01 RX ADMIN — HYDROXYZINE HYDROCHLORIDE 25 MG: 10 TABLET, FILM COATED ORAL at 21:51

## 2020-01-01 RX ADMIN — POTASSIUM CHLORIDE 10 MEQ: 7.46 INJECTION, SOLUTION INTRAVENOUS at 13:05

## 2020-01-01 RX ADMIN — DOCUSATE SODIUM 100 MG: 100 CAPSULE, LIQUID FILLED ORAL at 09:59

## 2020-01-01 RX ADMIN — METHYLPREDNISOLONE SODIUM SUCCINATE 80 MG: 125 INJECTION, POWDER, FOR SOLUTION INTRAMUSCULAR; INTRAVENOUS at 00:04

## 2020-01-01 RX ADMIN — LORAZEPAM 0.5 MG: 2 INJECTION INTRAMUSCULAR; INTRAVENOUS at 10:30

## 2020-01-01 RX ADMIN — METHYLPREDNISOLONE SODIUM SUCCINATE 40 MG: 40 INJECTION, POWDER, FOR SOLUTION INTRAMUSCULAR; INTRAVENOUS at 03:49

## 2020-01-01 RX ADMIN — SODIUM CHLORIDE: 9 INJECTION, SOLUTION INTRAVENOUS at 22:49

## 2020-01-01 RX ADMIN — TRAMADOL HYDROCHLORIDE 50 MG: 50 TABLET, FILM COATED ORAL at 22:16

## 2020-01-01 RX ADMIN — TRAMADOL HYDROCHLORIDE 50 MG: 50 TABLET ORAL at 05:19

## 2020-01-01 RX ADMIN — HYDROXYZINE HYDROCHLORIDE 25 MG: 10 TABLET, FILM COATED ORAL at 08:44

## 2020-01-01 RX ADMIN — LISINOPRIL 10 MG: 10 TABLET ORAL at 08:44

## 2020-01-01 RX ADMIN — IPRATROPIUM BROMIDE AND ALBUTEROL SULFATE 1 AMPULE: .5; 3 SOLUTION RESPIRATORY (INHALATION) at 14:09

## 2020-01-01 RX ADMIN — SODIUM CHLORIDE: 9 INJECTION, SOLUTION INTRAVENOUS at 21:47

## 2020-01-01 RX ADMIN — IPRATROPIUM BROMIDE AND ALBUTEROL SULFATE 1 AMPULE: .5; 3 SOLUTION RESPIRATORY (INHALATION) at 19:16

## 2020-01-01 RX ADMIN — MONTELUKAST SODIUM 10 MG: 10 TABLET, FILM COATED ORAL at 22:17

## 2020-01-01 RX ADMIN — DOXYCYCLINE 100 MG: 100 INJECTION, POWDER, LYOPHILIZED, FOR SOLUTION INTRAVENOUS at 11:31

## 2020-01-01 RX ADMIN — TRAMADOL HYDROCHLORIDE 50 MG: 50 TABLET ORAL at 18:05

## 2020-01-01 RX ADMIN — DILTIAZEM HYDROCHLORIDE 300 MG: 180 CAPSULE, COATED, EXTENDED RELEASE ORAL at 08:25

## 2020-01-01 RX ADMIN — IPRATROPIUM BROMIDE AND ALBUTEROL SULFATE 1 AMPULE: .5; 3 SOLUTION RESPIRATORY (INHALATION) at 06:38

## 2020-01-01 RX ADMIN — DOCUSATE SODIUM 100 MG: 100 CAPSULE, LIQUID FILLED ORAL at 09:47

## 2020-01-01 RX ADMIN — DOXYCYCLINE 100 MG: 100 INJECTION, POWDER, LYOPHILIZED, FOR SOLUTION INTRAVENOUS at 10:44

## 2020-01-01 RX ADMIN — HYDROXYZINE HYDROCHLORIDE 25 MG: 10 TABLET, FILM COATED ORAL at 09:08

## 2020-01-01 RX ADMIN — METHYLPREDNISOLONE SODIUM SUCCINATE 80 MG: 125 INJECTION, POWDER, FOR SOLUTION INTRAMUSCULAR; INTRAVENOUS at 00:22

## 2020-01-01 RX ADMIN — KETOROLAC TROMETHAMINE 30 MG: 30 INJECTION, SOLUTION INTRAMUSCULAR at 22:17

## 2020-01-01 RX ADMIN — ENOXAPARIN SODIUM 40 MG: 40 INJECTION SUBCUTANEOUS at 09:08

## 2020-01-01 RX ADMIN — DOXYCYCLINE 100 MG: 100 INJECTION, POWDER, LYOPHILIZED, FOR SOLUTION INTRAVENOUS at 10:30

## 2020-01-01 RX ADMIN — HYDROXYZINE HYDROCHLORIDE 25 MG: 10 TABLET, FILM COATED ORAL at 22:49

## 2020-01-01 RX ADMIN — POTASSIUM CHLORIDE 10 MEQ: 7.46 INJECTION, SOLUTION INTRAVENOUS at 15:05

## 2020-01-01 RX ADMIN — MONTELUKAST SODIUM 10 MG: 10 TABLET, FILM COATED ORAL at 22:51

## 2020-01-01 RX ADMIN — IPRATROPIUM BROMIDE AND ALBUTEROL SULFATE 1 AMPULE: .5; 3 SOLUTION RESPIRATORY (INHALATION) at 10:13

## 2020-01-01 RX ADMIN — DOXYCYCLINE 100 MG: 100 INJECTION, POWDER, LYOPHILIZED, FOR SOLUTION INTRAVENOUS at 21:51

## 2020-01-01 RX ADMIN — HYDROXYZINE HYDROCHLORIDE 25 MG: 10 TABLET, FILM COATED ORAL at 13:07

## 2020-01-01 RX ADMIN — IPRATROPIUM BROMIDE AND ALBUTEROL SULFATE 1 AMPULE: .5; 3 SOLUTION RESPIRATORY (INHALATION) at 18:25

## 2020-01-01 RX ADMIN — KETOROLAC TROMETHAMINE 30 MG: 30 INJECTION, SOLUTION INTRAMUSCULAR at 10:30

## 2020-01-01 RX ADMIN — POTASSIUM CHLORIDE 10 MEQ: 7.46 INJECTION, SOLUTION INTRAVENOUS at 16:07

## 2020-01-01 RX ADMIN — HYDROXYZINE HYDROCHLORIDE 25 MG: 10 TABLET, FILM COATED ORAL at 13:35

## 2020-01-01 RX ADMIN — DILTIAZEM HYDROCHLORIDE 300 MG: 180 CAPSULE, COATED, EXTENDED RELEASE ORAL at 13:04

## 2020-01-01 RX ADMIN — METHYLPREDNISOLONE SODIUM SUCCINATE 80 MG: 125 INJECTION, POWDER, FOR SOLUTION INTRAMUSCULAR; INTRAVENOUS at 15:01

## 2020-01-01 RX ADMIN — IPRATROPIUM BROMIDE AND ALBUTEROL SULFATE 1 AMPULE: .5; 3 SOLUTION RESPIRATORY (INHALATION) at 15:01

## 2020-01-01 RX ADMIN — IPRATROPIUM BROMIDE AND ALBUTEROL SULFATE 1 AMPULE: .5; 3 SOLUTION RESPIRATORY (INHALATION) at 10:43

## 2020-01-01 RX ADMIN — ENOXAPARIN SODIUM 40 MG: 40 INJECTION SUBCUTANEOUS at 08:45

## 2020-01-01 RX ADMIN — LORAZEPAM 0.5 MG: 2 INJECTION INTRAMUSCULAR; INTRAVENOUS at 10:47

## 2020-01-01 RX ADMIN — DOXYCYCLINE 100 MG: 100 INJECTION, POWDER, LYOPHILIZED, FOR SOLUTION INTRAVENOUS at 23:38

## 2020-01-01 RX ADMIN — PREDNISONE 40 MG: 20 TABLET ORAL at 08:25

## 2020-01-01 RX ADMIN — IPRATROPIUM BROMIDE AND ALBUTEROL SULFATE 1 AMPULE: .5; 3 SOLUTION RESPIRATORY (INHALATION) at 11:00

## 2020-01-01 RX ADMIN — DOXYCYCLINE 100 MG: 100 INJECTION, POWDER, LYOPHILIZED, FOR SOLUTION INTRAVENOUS at 22:50

## 2020-01-01 RX ADMIN — HYDROXYZINE HYDROCHLORIDE 25 MG: 10 TABLET, FILM COATED ORAL at 14:53

## 2020-01-01 RX ADMIN — PANTOPRAZOLE SODIUM 40 MG: 40 TABLET, DELAYED RELEASE ORAL at 06:38

## 2020-01-01 RX ADMIN — KETOROLAC TROMETHAMINE 30 MG: 30 INJECTION, SOLUTION INTRAMUSCULAR at 04:23

## 2020-01-01 RX ADMIN — Medication 500 MG: at 01:30

## 2020-01-01 RX ADMIN — KETOROLAC TROMETHAMINE 30 MG: 30 INJECTION, SOLUTION INTRAMUSCULAR at 09:46

## 2020-01-01 RX ADMIN — SODIUM CHLORIDE: 9 INJECTION, SOLUTION INTRAVENOUS at 06:17

## 2020-01-01 RX ADMIN — IPRATROPIUM BROMIDE AND ALBUTEROL SULFATE 1 AMPULE: .5; 3 SOLUTION RESPIRATORY (INHALATION) at 14:48

## 2020-01-01 RX ADMIN — ENOXAPARIN SODIUM 40 MG: 40 INJECTION SUBCUTANEOUS at 09:58

## 2020-01-01 RX ADMIN — SODIUM CHLORIDE: 9 INJECTION, SOLUTION INTRAVENOUS at 08:08

## 2020-01-01 RX ADMIN — ENOXAPARIN SODIUM 40 MG: 40 INJECTION SUBCUTANEOUS at 08:10

## 2020-01-01 RX ADMIN — HYDROXYZINE HYDROCHLORIDE 25 MG: 10 TABLET, FILM COATED ORAL at 08:05

## 2020-01-01 RX ADMIN — IPRATROPIUM BROMIDE AND ALBUTEROL SULFATE 1 AMPULE: .5; 3 SOLUTION RESPIRATORY (INHALATION) at 07:14

## 2020-01-01 RX ADMIN — IPRATROPIUM BROMIDE AND ALBUTEROL SULFATE 1 AMPULE: .5; 3 SOLUTION RESPIRATORY (INHALATION) at 10:05

## 2020-01-01 RX ADMIN — IPRATROPIUM BROMIDE AND ALBUTEROL SULFATE 1 AMPULE: .5; 3 SOLUTION RESPIRATORY (INHALATION) at 10:27

## 2020-01-01 RX ADMIN — PANTOPRAZOLE SODIUM 40 MG: 40 TABLET, DELAYED RELEASE ORAL at 08:11

## 2020-01-01 RX ADMIN — IPRATROPIUM BROMIDE AND ALBUTEROL SULFATE 1 AMPULE: .5; 3 SOLUTION RESPIRATORY (INHALATION) at 17:29

## 2020-01-01 SDOH — HEALTH STABILITY: MENTAL HEALTH: HOW OFTEN DO YOU HAVE A DRINK CONTAINING ALCOHOL?: NEVER

## 2020-01-01 ASSESSMENT — PAIN DESCRIPTION - DIRECTION
RADIATING_TOWARDS: NO

## 2020-01-01 ASSESSMENT — ENCOUNTER SYMPTOMS
DIARRHEA: 0
NAUSEA: 0
COUGH: 1
VOMITING: 0
COUGH: 1
DIARRHEA: 0
CHOKING: 0
CHOKING: 0
DIARRHEA: 0
SHORTNESS OF BREATH: 1
SHORTNESS OF BREATH: 1
DIARRHEA: 0
NAUSEA: 0
SHORTNESS OF BREATH: 1
DIARRHEA: 0
VOMITING: 0
CHOKING: 0
NAUSEA: 0
NAUSEA: 0
COUGH: 1
CHOKING: 0
RHINORRHEA: 0
VOICE CHANGE: 0
CHEST TIGHTNESS: 1
WHEEZING: 1
SHORTNESS OF BREATH: 1
COUGH: 1
EYE REDNESS: 0
WHEEZING: 1
ABDOMINAL PAIN: 0
VOMITING: 0
CONSTIPATION: 1
CONSTIPATION: 1
VOMITING: 0
COUGH: 1
WHEEZING: 1
VOMITING: 0
EYE PAIN: 0
VOMITING: 0
WHEEZING: 1
DIARRHEA: 0
CHOKING: 0
NAUSEA: 0
SHORTNESS OF BREATH: 1
EYES NEGATIVE: 1
CONSTIPATION: 1

## 2020-01-01 ASSESSMENT — PAIN DESCRIPTION - LOCATION
LOCATION: RIB CAGE
LOCATION: BACK

## 2020-01-01 ASSESSMENT — PAIN DESCRIPTION - PAIN TYPE
TYPE: CHRONIC PAIN
TYPE: ACUTE PAIN
TYPE: CHRONIC PAIN
TYPE: CHRONIC PAIN

## 2020-01-01 ASSESSMENT — PAIN SCALES - GENERAL
PAINLEVEL_OUTOF10: 7
PAINLEVEL_OUTOF10: 6
PAINLEVEL_OUTOF10: 7
PAINLEVEL_OUTOF10: 10
PAINLEVEL_OUTOF10: 8
PAINLEVEL_OUTOF10: 8
PAINLEVEL_OUTOF10: 0
PAINLEVEL_OUTOF10: 6
PAINLEVEL_OUTOF10: 7
PAINLEVEL_OUTOF10: 0
PAINLEVEL_OUTOF10: 7
PAINLEVEL_OUTOF10: 0
PAINLEVEL_OUTOF10: 10
PAINLEVEL_OUTOF10: 8
PAINLEVEL_OUTOF10: 7
PAINLEVEL_OUTOF10: 7
PAINLEVEL_OUTOF10: 10
PAINLEVEL_OUTOF10: 7
PAINLEVEL_OUTOF10: 10
PAINLEVEL_OUTOF10: 0
PAINLEVEL_OUTOF10: 7
PAINLEVEL_OUTOF10: 10
PAINLEVEL_OUTOF10: 7
PAINLEVEL_OUTOF10: 0
PAINLEVEL_OUTOF10: 7
PAINLEVEL_OUTOF10: 0
PAINLEVEL_OUTOF10: 10
PAINLEVEL_OUTOF10: 7
PAINLEVEL_OUTOF10: 7

## 2020-01-01 ASSESSMENT — PAIN DESCRIPTION - PROGRESSION
CLINICAL_PROGRESSION: NOT CHANGED

## 2020-01-01 ASSESSMENT — PAIN DESCRIPTION - ONSET
ONSET: ON-GOING

## 2020-01-01 ASSESSMENT — PAIN - FUNCTIONAL ASSESSMENT

## 2020-01-01 ASSESSMENT — PAIN DESCRIPTION - FREQUENCY
FREQUENCY: CONTINUOUS

## 2020-01-01 ASSESSMENT — PAIN DESCRIPTION - DESCRIPTORS
DESCRIPTORS: SQUEEZING
DESCRIPTORS: SQUEEZING
DESCRIPTORS: ACHING
DESCRIPTORS: ACHING
DESCRIPTORS: TIGHTNESS
DESCRIPTORS: ACHING
DESCRIPTORS: SQUEEZING
DESCRIPTORS: SQUEEZING
DESCRIPTORS: ACHING
DESCRIPTORS: SQUEEZING

## 2020-01-01 ASSESSMENT — PAIN DESCRIPTION - ORIENTATION
ORIENTATION: LOWER
ORIENTATION: MID
ORIENTATION: MID
ORIENTATION: UPPER
ORIENTATION: UPPER
ORIENTATION: LOWER
ORIENTATION: UPPER
ORIENTATION: LOWER
ORIENTATION: UPPER

## 2020-02-01 ENCOUNTER — OUTSIDE FACILITY SERVICE (OUTPATIENT)
Dept: PULMONOLOGY | Facility: CLINIC | Age: 65
End: 2020-02-01

## 2020-02-01 PROBLEM — A41.9 SEPSIS (HCC): Status: ACTIVE | Noted: 2020-01-01

## 2020-02-01 PROBLEM — R06.02 SHORTNESS OF BREATH: Status: ACTIVE | Noted: 2020-01-01

## 2020-02-01 PROCEDURE — 99254 IP/OBS CNSLTJ NEW/EST MOD 60: CPT | Performed by: INTERNAL MEDICINE

## 2020-02-01 NOTE — CONSULTS
Pulmonary and Critical Care Consult Note  THE [facility] Maria Alejandra Reason    MR# [id number]    Acct# [de-identified]  2/1/2020   2:24 PM    Referring Yamila Jaffe MD  Chief Complaint: Dyspnea with increasing anxiety    HPI: We have been consulted to see this 59y.o. year old male born on 1955. Patient complains of severe shortness of breath in the entire chest for several weeks,  aggravated by exertion and Anxiety and alleviated by home trilogy machine, and associated with Chest congestion. He has known very severe COPD alpha-1 antitrypsin deficiency. His sister who is at bedside reports that he was hospitalized at [facility]  back in December with sepsis and he has not return to his previous baseline since being discharged from that admission. Prior to the hospitalization in [facility] , the patient was receiving weekly alpha-1 deficiency replacement infusions; on [place] the past 4 years. He is a former smoker who quit 4 years ago. He is followed by a pulmonologist in [facility]. Current ABGs show a respiratory alkalosis. The patient is on 4 L nasal cannula oxygen which is his baseline setting at home. He is reporting that he is having increased anxiety which then makes him even more short of breath. He takes Trelegy and Combivent at home. Past Medical History      Past Medical History:   Diagnosis Date    [facility] obstructive [facility]) [facility] [address]     Emphysema due to alpha-1-antitrypsin deficiency ([facility] [facility]     GERD without esophagitis      SurgicalHistory  History reviewed. No pertinent surgical history.   Allergies  Allergies   Allergen Reactions    Codeine     Levofloxacin Other (See Comments)    Penicillins     Sulfa Antibiotics      Medications    sodium chloride flush, 10 mL, Intravenous, 2 times per day    enoxaparin, 40 mg, Subcutaneous, Daily    diltiazem, 300 mg, Oral, Daily    docusate sodium, 100 mg, Oral, Daily    fluticasone-umeclidin-vilant, 1 is cachectic he has pulmonary cachexia. He has a trilogy machine in the room. Recommend he go ahead and use a trilogy machine. Recommend continuing bronchodilators. Add hydroxyzine and increase steroids. We will follow.

## 2020-02-01 NOTE — ED PROVIDER NOTES
[facility] [address]      Pt Name: Easton Melvin  MRN: [id number]  [place] 1955  Date of evaluation: 1/31/2020  Provider: Maddy Rendon MD    CHIEF COMPLAINT       Chief Complaint   Patient presents with    Shortness of Breath         HISTORY OF PRESENT ILLNESS   (Location/Symptom, Timing/Onset,Context/Setting, Quality, Duration, Modifying Factors, Severity)  Note limiting factors. Easton Melvin is a 59 y.o. male who presents to the emergency department for increased shortness of breath throughout the day. Patient has a history of COPD secondary to alpha-1 antitrypsin deficiency per medical records in care everywhere. Patient wears 4 L of oxygen throughout the day and then uses a trilogy at night. Patient and his sister state the shortness of breath worsened yesterday when he took 2 pills the seem to get stuck on the way down and caused him to choke. They state since then he has been breathing treatments have not helped much. Denies any increase in coughing, fevers, or other new symptoms recently. Patient was hospitalized in [facility]  1 month ago with a lung infection. Patient states that he has pain in his lungs with breathing which is chronic but seems worse today. HPI    NursingNotes were reviewed. REVIEW OF SYSTEMS    (2-9 systems for level 4, 10 or more for level 5)     Review of Systems   Constitutional: Negative for fatigue and fever. HENT: Negative for congestion, rhinorrhea and voice change. Eyes: Negative for pain and redness. Respiratory: Positive for cough and shortness of breath. Cardiovascular: Negative for chest pain. Gastrointestinal: Negative for abdominal pain, diarrhea and vomiting. Endocrine: Negative. Genitourinary: Negative. Musculoskeletal: Negative for arthralgias and gait problem. Skin: Negative for rash and wound. Neurological: Positive for weakness. Negative for headaches. Hematological: Negative. Psychiatric/Behavioral: Negative. All other systems reviewed and are negative. A complete review of systems was performed and is negative except as noted above in the HPI. PAST MEDICAL HISTORY     Past Medical History:   Diagnosis Date    [facility] ([facility] obstructive [facility] disease) [facility] [address]     [facility] to [facility]     GERD without esophagitis          SURGICAL HISTORY     History reviewed. No pertinent surgical history.       CURRENT MEDICATIONS       Current Discharge Medication List      CONTINUE these medications which have NOT CHANGED    Details   chlorpheniramine (CHLOR-TRIMETON) 4 MG tablet Allergy Relief (chlorpheniramine) 4 mg tablet   TAKE 2 TABLETS BY MOUTH TWICE A DAY      ipratropium-albuterol (DUONEB) 0.5-2.5 (3) MG/3ML SOLN nebulizer solution ipratropium 0.5 mg-albuterol 3 mg (2.5 mg base)/3 mL nebulization soln   USE 1 VIAL IN NEBULIZER 4 TIMES A DAY. J44.9      predniSONE (DELTASONE) 10 MG tablet prednisone 10 mg tablet   TAKE 2 TABLETS BY MOUTH TWICE A DAY WITH MEALS FOR 30 DAYS      montelukast (SINGULAIR) 10 MG tablet montelukast 10 mg tablet   TAKE 1 TABLET BY MOUTH EVERY DAY      albuterol-ipratropium (COMBIVENT RESPIMAT)  MCG/ACT AERS inhaler Combivent Respimat 20 mcg-100 mcg/actuation solution for inhalation   INHALE 1 PUFF BY MOUTH 4 TIMES A DAY      fluticasone-umeclidin-vilant (TRELEGY ELLIPTA) 100-62.5-25 MCG/INH AEPB Trelegy Ellipta 100 mcg-62.5 mcg-25 mcg powder for inhalation   INHALE 1 PUFF BY MOUTH EVERY DAY      docusate sodium (COLACE) 100 MG capsule Take 100 mg by mouth daily      diltiazem (DILTIAZEM CD) 300 MG extended release capsule diltiazem  mg capsule,extended release 24 hr   TAKE 1 CAPSULE BY MOUTH EVERY DAY*STOP AMLODIPINE*      omeprazole (PRILOSEC) 20 MG delayed release capsule omeprazole 20 mg capsule,delayed release   TAKE 1 CAPSULE BY MOUTH EVERY DAY      Multiple Vitamins-Minerals (CENTRUM SILVER 50+MEN acute distress. Appearance: He is well-developed. He is cachectic. He is ill-appearing. He is not toxic-appearing or diaphoretic. HENT:      Head: Normocephalic and atraumatic. Eyes:      General: No scleral icterus. Right eye: No discharge. Left eye: No discharge. Pupils: Pupils are equal, round, and reactive to light. Neck:      Musculoskeletal: Normal range of motion. Cardiovascular:      Rate and Rhythm: Normal rate and regular rhythm. Pulmonary:      Effort: Tachypnea and accessory muscle usage present. No respiratory distress. Breath sounds: No stridor. Wheezing present. Comments: Patient with diffuse generalized wheezing with limited airflow throughout. Has accessory muscle usage but no overt respiratory distress. Patient able to speak 1-2 full sentences without having to stop intake of breath. Chest:      Comments: Barrel chested  Abdominal:      General: There is no distension. Musculoskeletal: Normal range of motion. General: No deformity. Skin:     General: Skin is warm and dry. Neurological:      General: No focal deficit present. Mental Status: He is alert and oriented to person, place, and time. GCS: GCS eye subscore is 4. GCS verbal subscore is 5. GCS motor subscore is 6. Cranial Nerves: No cranial nerve deficit. Motor: No abnormal muscle tone. Comments: Generalized weakness without any focal or lateralizing deficits   Psychiatric:         Behavior: Behavior normal.         Thought Content: Thought content normal.         Judgment: Judgment normal.         DIAGNOSTIC RESULTS     EKG: All EKG's are interpreted by the Emergency Department Physician who either signs or Co-signs this chart in the absence of a cardiologist.    Sinus tachycardia with no significant ischemic changes. Nonspecific findings likely related to elevated heart rate.     RADIOLOGY:   Non-plain film images such as CT, Ultrasound and MRI are read by the radiologistTanisha Murguia images are visualized and preliminarily interpreted by the emergency physician with the below findings:        Interpretation per the Radiologist below, if available at the time of this note:    XR CHEST PORTABLE    (Results Pending)         ED BEDSIDE ULTRASOUND:   Performed by ED Physician - none    LABS:  Labs Reviewed   BLOOD GAS, ARTERIAL - Abnormal; Notable for the following components:       Result Value    pH, Arterial 7.510 (*)     pCO2, Arterial 34.0 (*)     pO2, Arterial 158.0 (*)     HCO3, Arterial 27.1 (*)     Base Excess, Arterial 4.3 (*)     All other components within normal limits    Narrative:     Marsha Speak tel. [phone], DR. ZANE DEGROOT, 02/01/2020 03:31, by MALISSA   CBC WITH AUTO DIFFERENTIAL - Abnormal; Notable for the following components:    WBC 24.3 (*)     RBC 4.45 (*)     Hemoglobin 13.3 (*)     Hematocrit 41.8 (*)     MCHC 31.8 (*)     RDW 16.2 (*)     Neutrophils % 75.0 (*)     Lymphocytes % 14.0 (*)     Neutrophils Absolute 19.2 (*)     Monocytes Absolute 1.70 (*)     Myelocyte Percent 2 (*)     Anisocytosis 1+ (*)     Ovalocytes Occasional (*)     All other components within normal limits   COMPREHENSIVE METABOLIC PANEL W/ REFLEX TO MG FOR LOW K - Abnormal; Notable for the following components:    Sodium 135 (*)     Chloride 95 (*)     Glucose 160 (*)     Alb 3.2 (*)     ALT 43 (*)     All other components within normal limits   LACTIC ACID, PLASMA - Abnormal; Notable for the following components:    Lactic Acid 2.2 (*)     All other components within normal limits    Narrative:     Marsha Speak tel. [phone],  Chemistry results called to and read back by [facility] RN/ER,  02/01/2020 04:15, by JOSEPHINE   URINALYSIS - Abnormal; Notable for the following components:    Urobilinogen, Urine 4.0 (*)     All other components within normal limits   CBC WITH AUTO DIFFERENTIAL - Abnormal; Notable for the following components:    WBC stabilization as necessary were provided in the emergency department prior to transfer of care to the medicine service. CONSULTS:  None    PROCEDURES:  Unless otherwise notedbelow, none     Procedures      FINAL IMPRESSION     1. COPD exacerbation ([address]    2. Chronic respiratory failure with hypoxia (HCC)    3. H/O alpha-1-antitrypsin deficiency    4. Sepsis without acute organ dysfunction, due to unspecified organism [facility])          DISPOSITION/PLAN   DISPOSITION        PATIENT REFERRED TO:  Demetrius Mosqueda MD  [address] [place] [address]  [phone]            DISCHARGE MEDICATIONS:  Current Discharge Medication List             (Please note that portions of this note were completed with a voice recognition program.  Efforts were made to edit the dictations butoccasionally words are mis-transcribed.)    Gina Campos MD (electronically signed)  AttendingEmergency Physician         Gina Campos., MD  02/01/20 8288

## 2020-02-01 NOTE — ED NOTES
Bed: 02-A  Expected date: 1/31/20  Expected time:   Means of arrival: [facility]  Comments:  [address]  01/31/20 3597

## 2020-02-01 NOTE — H&P
[facility] - History & Physical    0412/412-02  PCP: Sheron Resendiz MD  Date of Admission:1/31/2020   Date of Service: Pt seen/examined on2/1/2020 and Admitted to Inpatient with expected LOS greater than two midnights due to medical therapy. Chief Complaint:  SOB    History Of Present Illness: The patient is a 59 y.o. male who presented complaining of worsening SOB. Pt reports his SOB began about 1-2 days prior to admission. He noted some chest tightness with the SOB, but denies any associated cough, fever, or chills. No recent sick contacts. His SOB is worse with even minimal exertion. He attempted breathing treatments at home with minimal improvement. Pt chronically uses 4L O2 at home during the day with Trilogy at nighttime. Of note, pt is from [place] and reports having sepsis from an \"infection in my lungs\" about 1 month ago. At time of interview, pt was using his Trilogy and speaking in full sentences. Denied any current CP, abd pain, N/V, F/C    Review of Systems: 14 systems reviewed, negative unless previously stated in HPI    Past Medical History:        Diagnosis Date    COPD (chronic obstructive [facility] disease) ([facility] [address]     Emphysema due to alpha-1-antitrypsin deficiency ([facility] [address]     GERD without esophagitis        Past Surgical History:    History reviewed. No pertinent surgical history. Home Medications:  Prior to Admission medications    Medication Sig Start Date End Date Taking?  Authorizing Provider   chlorpheniramine (CHLOR-TRIMETON) 4 MG tablet Allergy Relief (chlorpheniramine) 4 mg tablet   TAKE 2 TABLETS BY MOUTH TWICE A DAY   Yes Historical Provider, MD   ipratropium-albuterol (DUONEB) 0.5-2.5 (3) MG/3ML SOLN nebulizer solution ipratropium 0.5 mg-albuterol 3 mg (2.5 mg base)/3 mL nebulization soln   USE 1 VIAL IN NEBULIZER 4 TIMES A DAY. J44.9   Yes Historical Provider, MD   predniSONE (DELTASONE) 10 MG tablet prednisone 10 mg tablet   TAKE 2 TABLETS BY MOUTH TWICE A DAY WITH MEALS FOR 30 DAYS   Yes Historical Provider, MD   montelukast (SINGULAIR) 10 MG tablet montelukast 10 mg tablet   TAKE 1 TABLET BY MOUTH EVERY DAY   Yes Historical Provider, MD   albuterol-ipratropium (COMBIVENT RESPIMAT)  MCG/ACT AERS inhaler Combivent Respimat 20 mcg-100 mcg/actuation solution for inhalation   INHALE 1 PUFF BY MOUTH 4 TIMES A DAY   Yes Historical Provider, MD   fluticasone-umeclidin-vilant (TRELEGY ELLIPTA) 100-62.5-25 MCG/INH AEPB Trelegy Ellipta 100 mcg-62.5 mcg-25 mcg powder for inhalation   INHALE 1 PUFF BY MOUTH EVERY DAY   Yes Historical Provider, MD   docusate sodium (COLACE) 100 MG capsule Take 100 mg by mouth daily   Yes Historical Provider, MD   diltiazem (DILTIAZEM CD) 300 MG extended release capsule diltiazem  mg capsule,extended release 24 hr   TAKE 1 CAPSULE BY MOUTH EVERY DAY*STOP AMLODIPINE*   Yes Historical Provider, MD   omeprazole (PRILOSEC) 20 MG delayed release capsule omeprazole 20 mg capsule,delayed release   TAKE 1 CAPSULE BY MOUTH EVERY DAY   Yes Historical Provider, MD   Multiple Vitamins-Minerals (CENTRUM SILVER 50+MEN PO) Take by mouth   Yes Historical Provider, MD   Dextromethorphan-guaiFENesin (MUCINEX DM MAXIMUM STRENGTH)  MG TB12 Take by mouth   Yes Historical Provider, MD       Allergies:    Codeine; Levofloxacin; Penicillins; and Sulfa antibiotics    Social History:    Tobacco:   reports that he has quit smoking. He has never used smokeless tobacco.  Alcohol:   reports no history of alcohol use. Illicit Drugs: Denies     Family History:  History reviewed. No pertinent family history. Physical Examination:  /86   Pulse 81   Temp 97.1 °F (36.2 °C)   Resp 24   Ht 6' 1\" (1.854 m)   Wt 117 lb (53.1 kg)   SpO2 97%   BMI 15.44 kg/m²   General appearance: Thin, frail, male, alert, cooperative and no distress  Head: Normocephalic, without obvious abnormality, atraumatic  Eyes: conjunctivae/corneas clear.  PERRL, EOM's intact. Ears:normal external ears  Neck:  supple, symmetrical, trachea midline  Lungs:  Diffusely decreased air entry, faint end exp wheeze, no increased WOB or accessory muscle use noted. Heart: regular rhythm and rate, S1, S2   Abdomen:soft, non-tender; non-distended normal bowel sounds  Extremities:Pedal edema none    Skin: Skin color, texture, turgor normal. No rashes or lesions  Neurologic: Alert and oriented X 3, no focal deficits appreciated  Psychiatric:  Normal mood      Data:    CBC:  Recent Labs     02/01/20  0006 02/01/20  0427   WBC 24.3* 19.8*   HGB 13.3* 12.5*   HCT 41.8* 40.7*    283     BMP:  Recent Labs     02/01/20  0006 02/01/20  0327   *  --    K 4.9 4.6   CL 95*  --    CO2 25  --    BUN 20  --    CREATININE <0.5  --    CALCIUM 8.8  --      Recent Labs     02/01/20  0006   AST 29   ALT 43*   BILITOT 0.8   ALKPHOS 125     Coag Panel: No results for input(s): INR, PROTIME, APTT in the last 72 hours. Cardiac Enzymes: No results for input(s): Landen Isrrael in the last 72 hours.   ABGs:  Lab Results   Component Value Date    PHART 7.510 02/01/2020    PO2ART 158.0 02/01/2020    ZFR9DXT 34.0 02/01/2020     Urinalysis:  Lab Results   Component Value Date    NITRU Negative 02/01/2020    BLOODU Negative 02/01/2020    SPECGRAV 1.025 02/01/2020    GLUCOSEU Negative 02/01/2020       Imaging:  XR CHEST PORTABLE    (Results Pending)    Active Hospital Problems    Diagnosis Date Noted    Shortness of breath [R06.02] 02/01/2020    Sepsis ([facility] [address] [A41.9] 02/01/2020    COPD [facility]chronic obstructive [facility] disease) ([facility] [J44.9]     Emphysema due to [facility]-antitry[facility] deficiency [facility] [J43.8, E88.01]     GERD without esophagitis [K21.9]        IMPRESSION   Principal Problem:    Shortness of breath  Active Problems:    COPD (chronic obstructive pulmonary disease) ([facility])    Emphysema due to alpha-1-antitrypsin deficiency (HCC)    GERD without esophagitis    Sepsis ([facility] [address]  Resolved Problems:    * No resolved hospital problems. *      PLAN:  1) Sepsis, unclear etiology. SOB(but no cough or fever) with leukocytosis and LA of 2.2. No gross opacifications or consolidations on CXR (final read pending). UA without signs of infection. No obvious SSTI infection noted on exam. Check rapid flu. Started on IVF and broad spectrum abx for possible pna. Consider broadening if no signs of improvement as pt was reportedly hospitalized about 1 month ago. Will trend LA. Cont supportive care. F/u blood cultures    2) hx of COPD. Cont supplemental O2 with nighttime trilogy. Duonebs PRN. Complete short course of steroids.        Shirlene Byrne MD  2/1/2020

## 2020-02-02 ENCOUNTER — OUTSIDE FACILITY SERVICE (OUTPATIENT)
Dept: PULMONOLOGY | Facility: CLINIC | Age: 65
End: 2020-02-02

## 2020-02-02 PROCEDURE — 99232 SBSQ HOSP IP/OBS MODERATE 35: CPT | Performed by: INTERNAL MEDICINE

## 2020-02-02 NOTE — PLAN OF CARE
Problem: Falls - Risk of:  Goal: Will remain free from falls  Description  Will remain free from falls  2/1/2020 2355 by Irene Tate RN  Outcome: Ongoing  2/1/2020 1218 by Yvonne Akers RN  Outcome: Ongoing  Goal: Absence of physical injury  Description  Absence of physical injury  2/1/2020 2355 by Irene Tate RN  Outcome: Ongoing  2/1/2020 1218 by Yvonne Akers RN  Outcome: Ongoing     Problem:  Activity:  Goal: Fatigue will decrease  Description  Fatigue will decrease  2/1/2020 2355 by Irene Tate RN  Outcome: Ongoing  2/1/2020 1218 by Yvonne Akers RN  Outcome: Ongoing     Problem: Cardiac:  Goal: Hemodynamic stability will improve  Description  Hemodynamic stability will improve  2/1/2020 2355 by Irene Tate RN  Outcome: Ongoing  2/1/2020 1218 by Yvonne Akers RN  Outcome: Ongoing     Problem: Coping:  Goal: Level of anxiety will decrease  Description  Level of anxiety will decrease  2/1/2020 2355 by Irene Tate RN  Outcome: Ongoing  2/1/2020 1218 by Yvonne Akers RN  Outcome: Ongoing  Goal: Ability to cope will improve  Description  Ability to cope will improve  2/1/2020 2355 by Irene Tate RN  Outcome: Ongoing  2/1/2020 1218 by Yvonne Akers RN  Outcome: Ongoing  Goal: Ability to establish a method of communication will improve  Description  Ability to establish a method of communication will improve  2/1/2020 2355 by Irene Tate RN  Outcome: Ongoing  2/1/2020 1218 by Yvonne Akers RN  Outcome: Ongoing     Problem: Nutritional:  Goal: Consumption of the prescribed amount of daily calories will improve  Description  Consumption of the prescribed amount of daily calories will improve  2/1/2020 2355 by Irene Tate RN  Outcome: Ongoing  2/1/2020 1218 by Yvonne Akers RN  Outcome: Ongoing     Problem: Respiratory:  Goal: Ability to maintain a clear airway will improve  Description  Ability to maintain a clear airway will improve  2/1/2020 2355 by Raquel Thao RN  Outcome: Ongoing  2/1/2020 1218 by Karlene Lopez RN  Outcome: Ongoing  Goal: Ability to maintain adequate ventilation will improve  Description  Ability to maintain adequate ventilation will improve  2/1/2020 2355 by Raquel Thao RN  Outcome: Ongoing  2/1/2020 1218 by Karlene Lopez RN  Outcome: Ongoing  Goal: Complications related to the disease process, condition or treatment will be avoided or minimized  Description  Complications related to the disease process, condition or treatment will be avoided or minimized  2/1/2020 2355 by Raquel Thao RN  Outcome: Ongoing  2/1/2020 1218 by Karlene Lopez RN  Outcome: Ongoing     Problem: Skin Integrity:  Goal: Risk for impaired skin integrity will decrease  Description  Risk for impaired skin integrity will decrease  2/1/2020 2355 by Raquel Thao RN  Outcome: Ongoing  2/1/2020 1218 by Karlene Lopez RN  Outcome: Ongoing     Problem: Breathing Pattern - Ineffective:  Goal: Ability to achieve and maintain a regular respiratory rate will improve  Description  Ability to achieve and maintain a regular respiratory rate will improve  2/1/2020 2355 by Raquel Thao RN  Outcome: Ongoing  2/1/2020 1218 by Karlene Lopez RN  Outcome: Ongoing

## 2020-02-02 NOTE — PROGRESS NOTES
Pulmonary and Critical Care Progress Note [address] [facility]    Patient: Aileen Garcia  1955   MR# [id number]   Acct# [de-identified]  02/02/20   3:22 PM  Referring Provider: Zackery Nichols MD    Chief Complaint: COPD  Interval history: Patient complains of moderate shortness of breath in the mid chest for now days, aggravated by exertion and Deep breathing and alleviated by rest, oxygen and trilogy machine, and associated with Chest pain on deep breathing. Caryl Pih He thinks he is a little bit better today. Medications:   methylPREDNISolone, 60 mg, Intravenous, Q8H    doxycycline (VIBRAMYCIN) IV, 100 mg, Intravenous, Q12H    sodium chloride flush, 10 mL, Intravenous, 2 times per day    enoxaparin, 40 mg, Subcutaneous, Daily    diltiazem, 300 mg, Oral, Daily    docusate sodium, 100 mg, Oral, Daily    fluticasone-umeclidin-vilant, 1 puff, Inhalation, Daily    montelukast, 10 mg, Oral, Nightly    pantoprazole, 40 mg, Oral, QAM AC    magnesium citrate, 296 mL, Oral, Once    ipratropium-albuterol, 1 ampule, Inhalation, Q4H WA    hydrOXYzine, 25 mg, Oral, TID   Review of Systems: Review of Systems   Constitutional: Negative for fever. Respiratory: Negative for choking. Gastrointestinal: Negative for diarrhea, nausea and vomiting. Physical Exam:  Blood pressure (!) 148/89, pulse 66, temperature 97.2 °F (36.2 °C), temperature source Temporal, resp. rate 16, height 6' 1\" (1.854 m), weight 117 lb (53.1 kg), SpO2 98 %. Intake/Output Summary (Last 24 hours) at 2/2/2020 1522  Last data filed at 2/2/2020 1358  Gross per 24 hour   Intake 2214 ml   Output 1000 ml   Net 1214 ml     Physical Exam  Constitutional:       General: He is not in acute distress. Appearance: He is well-developed. He is ill-appearing. HENT:      Head: Normocephalic and atraumatic. Neck:      Trachea: No tracheal deviation. Cardiovascular:      Rate and Rhythm: Normal rate and regular rhythm.       Heart sounds: Normal heart

## 2020-02-02 NOTE — PLAN OF CARE
Problem: Falls - Risk of:  Goal: Will remain free from falls  Description  Will remain free from falls  2/2/2020 1117 by Mitchell Turner RN  Outcome: Ongoing  2/1/2020 2355 by Danny Baxter RN  Outcome: Ongoing  Goal: Absence of physical injury  Description  Absence of physical injury  2/2/2020 1117 by Mitchell Turner RN  Outcome: Ongoing  2/1/2020 2355 by Danny Baxter RN  Outcome: Ongoing     Problem:  Activity:  Goal: Fatigue will decrease  Description  Fatigue will decrease  2/2/2020 1117 by Mitchell Turner RN  Outcome: Ongoing  2/1/2020 2355 by Danny Baxter RN  Outcome: Ongoing     Problem: Cardiac:  Goal: Hemodynamic stability will improve  Description  Hemodynamic stability will improve  2/2/2020 1117 by Mitchell Turner RN  Outcome: Ongoing  2/1/2020 2355 by Danny Baxter RN  Outcome: Ongoing     Problem: Coping:  Goal: Level of anxiety will decrease  Description  Level of anxiety will decrease  2/2/2020 1117 by Mitchell Turner RN  Outcome: Ongoing  2/1/2020 2355 by Danny Baxter RN  Outcome: Ongoing  Goal: Ability to cope will improve  Description  Ability to cope will improve  2/2/2020 1117 by Mitchell Turner RN  Outcome: Ongoing  2/1/2020 2355 by Danny Baxter RN  Outcome: Ongoing  Goal: Ability to establish a method of communication will improve  Description  Ability to establish a method of communication will improve  2/2/2020 1117 by Mitchell Turner RN  Outcome: Ongoing  2/1/2020 2355 by Danny Baxter RN  Outcome: Ongoing     Problem: Nutritional:  Goal: Consumption of the prescribed amount of daily calories will improve  Description  Consumption of the prescribed amount of daily calories will improve  2/2/2020 1117 by Mitchell Turner RN  Outcome: Ongoing  2/1/2020 2355 by Danny Baxter RN  Outcome: Ongoing     Problem: Respiratory:  Goal: Ability to maintain a clear airway will improve  Description  Ability to maintain a clear airway will improve  2/2/2020 1117 by Fina Dash RN  Outcome: Ongoing  2/1/2020 2355 by Jos Metcalf RN  Outcome: Ongoing  Goal: Ability to maintain adequate ventilation will improve  Description  Ability to maintain adequate ventilation will improve  2/2/2020 1117 by Fina Dash RN  Outcome: Ongoing  2/1/2020 2355 by Jos Metcalf RN  Outcome: Ongoing  Goal: Complications related to the disease process, condition or treatment will be avoided or minimized  Description  Complications related to the disease process, condition or treatment will be avoided or minimized  2/2/2020 1117 by Fina Dash RN  Outcome: Ongoing  2/1/2020 2355 by Jos Metcalf RN  Outcome: Ongoing     Problem: Skin Integrity:  Goal: Risk for impaired skin integrity will decrease  Description  Risk for impaired skin integrity will decrease  2/2/2020 1117 by Fina Dash RN  Outcome: Ongoing  2/1/2020 2355 by Jos Metcalf RN  Outcome: Ongoing     Problem: Breathing Pattern - Ineffective:  Goal: Ability to achieve and maintain a regular respiratory rate will improve  Description  Ability to achieve and maintain a regular respiratory rate will improve  2/2/2020 1117 by Fina Dash RN  Outcome: Ongoing  2/1/2020 2355 by Jos Metcalf RN  Outcome: Ongoing

## 2020-02-02 NOTE — PROGRESS NOTES
[id number] [facility]      Patient:  Edi Sanchez  YOB: 1955  Date of Service: 2/2/2020  MRN: [id number]   Acct: [de-identified]   Primary Care Physician: Kelle Abad MD  Advance Directive: Full Code  Admit Date: 1/31/2020       Hospital Day: 1    Chief Complaint:   Chief Complaint   Patient presents with    Shortness of Breath       Subjective: pt seen and examined; still feeling veryweak; Sister at bedside. C/o pain in deep breathing.     Objective:   VITALS:  BP (!) 148/89   Pulse 66   Temp 97.2 °F (36.2 °C) (Temporal)   Resp 16   Ht 6' 1\" (1.854 m)   Wt 117 lb (53.1 kg)   SpO2 98%   BMI 15.44 kg/m²   24HR INTAKE/OUTPUT:      Intake/Output Summary (Last 24 hours) at 2/2/2020 0834  Last data filed at 2/2/2020 0014  Gross per 24 hour   Intake 2094 ml   Output 1000 ml   Net 1094 ml         Medications:      sodium chloride 100 mL/hr at 02/01/20 0617      sodium chloride flush  10 mL Intravenous 2 times per day    enoxaparin  40 mg Subcutaneous Daily    diltiazem  300 mg Oral Daily    docusate sodium  100 mg Oral Daily    fluticasone-umeclidin-vilant  1 puff Inhalation Daily    montelukast  10 mg Oral Nightly    pantoprazole  40 mg Oral QAM AC    magnesium citrate  296 mL Oral Once    ipratropium-albuterol  1 ampule Inhalation Q4H WA    methylPREDNISolone  80 mg Intravenous Q8H    hydrOXYzine  25 mg Oral TID     sodium chloride flush, magnesium hydroxide, ondansetron, potassium chloride **OR** potassium alternative oral replacement **OR** potassium chloride, acetaminophen, ipratropium-albuterol  DIET GENERAL;         Lab and other Data:     Recent Labs     02/01/20  0006 02/01/20 0427   WBC 24.3* 19.8*   HGB 13.3* 12.5*    283     Recent Labs     02/01/20  0006 02/01/20  0327 02/01/20  0427   *  --  139   K 4.9 4.6 4.7   CL 95*  --  95*   CO2 25  --  27   BUN 20  --  19   CREATININE <0.5  --  0.5   GLUCOSE 160*  --  134*     Recent Labs     02/01/20  0006   AST 29   ALT 43* BILITOT 0.8   ALKPHOS 125     Troponin T: No results for input(s): TROPONINI in the last 72 hours. Pro-BNP: No results for input(s): BNP in the last 72 hours. INR: No results for input(s): INR in the last 72 hours. ABGs:   Lab Results   Component Value Date    PHART 7.510 02/01/2020    PO2ART 158.0 02/01/2020    DQT3HMY 34.0 02/01/2020     UA:  Recent Labs     02/01/20  0006   COLORU Yellow   PHUR 6.0   CLARITYU Clear   SPECGRAV 1.025   LEUKOCYTESUR Negative   UROBILINOGEN 4.0*   BILIRUBINUR Negative   BLOODU Negative   GLUCOSEU Negative       Imaging:   XR CHEST PORTABLE   Final Result   1. Severe chronic lung changes. 2. Indeterminant mild asymmetric prominence of interstitial disease at   the left upper lobe. Signed by Dr Citlaly Islas on 2/1/2020 8:31 AM        Micro:   Blood Culture, Routine   Date Value Ref Range Status   02/01/2020   Preliminary    No Growth to date. Any change in status will be called.   , No components found for: LABURINE  Patient Active Problem List    Diagnosis Date Noted    Shortness of breath 02/01/2020    Sepsis ([address] 02/01/2020    COPD (chronic obstructive pulmonary disease) (HCC)     Emphysema due to alpha-1-antitrypsin deficiency ([address]     GERD without esophagitis        Assessment/plan:   Principal Problem:    Shortness of breath    Active Problems:    Chronic respiratory failure on home trilogy    Respiratory Alkalosis    COPD (chronic obstructive pulmonary disease) (HCC)    Emphysema due to alpha-1-antitrypsin deficiency ([address]    GERD without esophagitis    Sepsis ([address]         Plan:   1) Sepsis, unclear etiology. SOB(but no cough or fever) with leukocytosis and LA of 2.2. No gross opacifications or consolidations on CXR (final read pending). Cont supportive care. F/u blood cultures f/u sputum cultures; IV doxy +     2) hx of COPD. Cont supplemental O2 with nighttime trilogy. Duonebs PRN.  Complete short course of steroids.   -Pulm consulted    3) Atypical

## 2020-02-03 PROBLEM — Z51.5 PALLIATIVE CARE PATIENT: Status: ACTIVE | Noted: 2020-01-01

## 2020-02-03 NOTE — PROGRESS NOTES
[facility]      Patient:  Porter Mota  YOB: 1955  Date of Service: 2/3/2020  MRN: [id number]   Acct: [de-identified]   Primary Care Physician: Eliana Guerra MD  Advance Directive: Full Code  Admit Date: 1/31/2020       Hospital Day: 2    Chief Complaint:   Chief Complaint   Patient presents with    Shortness of Breath       Subjective: pt seen and examined; still feeling veryweak; c/o atypical chest pain on inspiration  Objective:   VITALS:  /71   Pulse 67   Temp 97.3 °F (36.3 °C) (Temporal)   Resp 18   Ht 6' 1\" (1.854 m)   Wt 123 lb 3 oz (55.9 kg)   SpO2 97%   BMI 16.25 kg/m²   24HR INTAKE/OUTPUT:      Intake/Output Summary (Last 24 hours) at 2/3/2020 0844  Last data filed at 2/3/2020 0443  Gross per 24 hour   Intake 1580 ml   Output 2400 ml   Net -820 ml         Medications:      sodium chloride 100 mL/hr at 02/02/20 2249      doxycycline (VIBRAMYCIN) IV  100 mg Intravenous Q12H    methylPREDNISolone  40 mg Intravenous Q12H    sodium chloride flush  10 mL Intravenous 2 times per day    enoxaparin  40 mg Subcutaneous Daily    diltiazem  300 mg Oral Daily    docusate sodium  100 mg Oral Daily    fluticasone-umeclidin-vilant  1 puff Inhalation Daily    montelukast  10 mg Oral Nightly    pantoprazole  40 mg Oral QAM AC    magnesium citrate  296 mL Oral Once    ipratropium-albuterol  1 ampule Inhalation Q4H WA    hydrOXYzine  25 mg Oral TID     ketorolac, sodium chloride flush, magnesium hydroxide, ondansetron, potassium chloride **OR** potassium alternative oral replacement **OR** potassium chloride, acetaminophen, ipratropium-albuterol  DIET GENERAL;         Lab and other Data:     Recent Labs     02/01/20  0006 02/01/20 0427   WBC 24.3* 19.8*   HGB 13.3* 12.5*    283     Recent Labs     02/01/20  0006 02/01/20  0327 02/01/20 0427   *  --  139   K 4.9 4.6 4.7   CL 95*  --  95*   CO2 25  --  27   BUN 20  --  19   CREATININE <0.5  --  0.5   GLUCOSE 160*  -- 134*     Recent Labs     02/01/20  0006   AST 29   ALT 43*   BILITOT 0.8   ALKPHOS 125     Troponin T: No results for input(s): TROPONINI in the last 72 hours. Pro-BNP: No results for input(s): BNP in the last 72 hours. INR: No results for input(s): INR in the last 72 hours. ABGs:   Lab Results   Component Value Date    PHART 7.510 02/01/2020    PO2ART 158.0 02/01/2020    SCK2PER 34.0 02/01/2020     UA:  Recent Labs     02/01/20  0006   COLORU Yellow   PHUR 6.0   CLARITYU Clear   SPECGRAV 1.025   LEUKOCYTESUR Negative   UROBILINOGEN 4.0*   BILIRUBINUR Negative   BLOODU Negative   GLUCOSEU Negative       Imaging:   XR CHEST PORTABLE   Final Result   1. Severe chronic lung changes. 2. Indeterminant mild asymmetric prominence of interstitial disease at   the left upper lobe. Signed by Dr Roya Pollack on 2/1/2020 8:31 AM        Micro:   Blood Culture, Routine   Date Value Ref Range Status   02/01/2020   Preliminary    No Growth to date. Any change in status will be called.   , No components found for: LABURINE  Patient Active Problem List    Diagnosis Date Noted    Shortness of breath 02/01/2020    Sepsis ([address] 02/01/2020    COPD (chronic obstructive pulmonary disease) (HCC)     Emphysema due to alpha-1-antitrypsin deficiency ([address]     GERD without esophagitis        Assessment/plan:   Principal Problem:    Shortness of breath    Active Problems:    Chronic respiratory failure on home trilogy    Respiratory Alkalosis    COPD (chronic obstructive pulmonary disease) (HCC)    Emphysema due to alpha-1-antitrypsin deficiency ([address]    GERD without esophagitis    Sepsis ([address]         Plan:   1) Sepsis, unclear etiology. SOB(but no cough or fever) with leukocytosis and LA of 2.2. No gross opacifications or consolidations on CXR (final read pending). Cont supportive care. F/u blood cultures f/u sputum cultures; IV doxy +     2) hx of COPD. Cont supplemental O2 with nighttime trilogy. Duonebs PRN.  Complete short course of steroids.   -Pulm consulted    3) Atypical chest pain; echo , CEx2; cardiology consulted    4) generalized weakness - PT/OT     consulted for placement.     DVT Prophylaxis: Marcelo Arreaga MD  Hospitalist Service  2/3/2020  8:44 AM

## 2020-02-03 NOTE — PLAN OF CARE
Problem: Falls - Risk of:  Goal: Will remain free from falls  Description  Will remain free from falls  Outcome: Ongoing  Goal: Absence of physical injury  Description  Absence of physical injury  Outcome: Ongoing     Problem:  Activity:  Goal: Fatigue will decrease  Description  Fatigue will decrease  Outcome: Ongoing  Goal: Ability to tolerate increased activity will improve  Description  Ability to tolerate increased activity will improve  Outcome: Ongoing     Problem: Cardiac:  Goal: Hemodynamic stability will improve  Description  Hemodynamic stability will improve  Outcome: Ongoing     Problem: Coping:  Goal: Level of anxiety will decrease  Description  Level of anxiety will decrease  Outcome: Ongoing  Goal: Ability to cope will improve  Description  Ability to cope will improve  Outcome: Ongoing  Goal: Ability to establish a method of communication will improve  Description  Ability to establish a method of communication will improve  Outcome: Ongoing     Problem: Nutritional:  Goal: Consumption of the prescribed amount of daily calories will improve  Description  Consumption of the prescribed amount of daily calories will improve  Outcome: Ongoing     Problem: Respiratory:  Goal: Ability to maintain a clear airway will improve  Description  Ability to maintain a clear airway will improve  Outcome: Ongoing  Goal: Ability to maintain adequate ventilation will improve  Description  Ability to maintain adequate ventilation will improve  Outcome: Ongoing  Goal: Complications related to the disease process, condition or treatment will be avoided or minimized  Description  Complications related to the disease process, condition or treatment will be avoided or minimized  Outcome: Ongoing     Problem: Skin Integrity:  Goal: Risk for impaired skin integrity will decrease  Description  Risk for impaired skin integrity will decrease  Outcome: Ongoing     Problem: Breathing Pattern - Ineffective:  Goal: Ability to achieve and maintain a regular respiratory rate will improve  Description  Ability to achieve and maintain a regular respiratory rate will improve  Outcome: Ongoing     Problem: Infection:  Goal: Will remain free from infection  Description  Will remain free from infection  Outcome: Ongoing     Problem: Safety:  Goal: Free from accidental physical injury  Description  Free from accidental physical injury  Outcome: Ongoing  Goal: Free from intentional harm  Description  Free from intentional harm  Outcome: Ongoing     Problem: Daily Care:  Goal: Daily care needs are met  Description  Daily care needs are met  Outcome: Ongoing     Problem: Pain:  Goal: Patient's pain/discomfort is manageable  Description  Patient's pain/discomfort is manageable  Outcome: Ongoing  Goal: Pain level will decrease  Description  Pain level will decrease  Outcome: Ongoing  Goal: Control of acute pain  Description  Control of acute pain  Outcome: Ongoing  Goal: Control of chronic pain  Description  Control of chronic pain  Outcome: Ongoing     Problem: Skin Integrity:  Goal: Skin integrity will stabilize  Description  Skin integrity will stabilize  Outcome: Ongoing     Problem: Discharge Planning:  Goal: Patients continuum of care needs are met  Description  Patients continuum of care needs are met  Outcome: Ongoing     Problem: ABCDS Injury Assessment  Goal: Absence of physical injury  Outcome: Ongoing     Problem: Bleeding:  Goal: Will show no signs and symptoms of excessive bleeding  Description  Will show no signs and symptoms of excessive bleeding  Outcome: Ongoing     Problem: Discharge Planning:  Goal: Discharged to appropriate level of care  Description  Discharged to appropriate level of care  Outcome: Ongoing

## 2020-02-03 NOTE — CARE COORDINATION
The [address] at [facility]  Notification of Admission Decision      [] Patient has been accepted for admit to [facility] on :       Please write discharge orders and summary prior to discharge. [] Patient acceptance to Rehab pending the following :    [] Eval in progress       [] Patient determined to be ineligible for services at [facility] because : Patient refusing to do PT/OT evals today d/t SOA after having a bed bath by the aide. Due to patient's respiratory status d/t not feel he could tolerate acute Rehab. We recommend you consider: SNF        Thank you for your referral, we appreciate you.     Electronically Signed by Agustin Rahman, Admissions Coordinator 2/3/2020 1:45 PM

## 2020-02-04 ENCOUNTER — OUTSIDE FACILITY SERVICE (OUTPATIENT)
Dept: PULMONOLOGY | Facility: CLINIC | Age: 65
End: 2020-02-04

## 2020-02-04 PROCEDURE — 99232 SBSQ HOSP IP/OBS MODERATE 35: CPT | Performed by: INTERNAL MEDICINE

## 2020-02-04 NOTE — PROGRESS NOTES
[facility]      Patient:  Hayes Ram  YOB: 1955  Date of Service: 2/4/2020  MRN: [id number]   Acct: [de-identified]   Primary Care Physician: Marija Reyes MD  Advance Directive: Full Code  Admit Date: 1/31/2020       Hospital Day: 3    Chief Complaint:   Chief Complaint   Patient presents with    Shortness of Breath       Subjective: pt seen and examined; appears to be having a panic attack breathing laborously  Objective:   VITALS:  /77   Pulse 72   Temp 96.8 °F (36 °C) (Temporal)   Resp 20   Ht 6' 1\" (1.854 m)   Wt 123 lb 3 oz (55.9 kg)   SpO2 96%   BMI 16.25 kg/m²   24HR INTAKE/OUTPUT:      Intake/Output Summary (Last 24 hours) at 2/4/2020 1128  Last data filed at 2/4/2020 0941  Gross per 24 hour   Intake 3200 ml   Output 2525 ml   Net 675 ml   Constitutional:       General: He is not in acute distress. Appearance: He is well-developed. He is cachectic. He is ill-appearing. He is not toxic-appearing or diaphoretic. HENT:      Head: Normocephalic and atraumatic. Eyes:      General: No scleral icterus. Right eye: No discharge. Left eye: No discharge. Pupils: Pupils are equal, round, and reactive to light. Neck:      Musculoskeletal: Normal range of motion. Cardiovascular:      Rate and Rhythm: Normal rate and regular rhythm. Pulmonary:      Effort: Tachypnea and accessory muscle usage present. No respiratory distress. Breath sounds: No stridor. Wheezing present. Chest:      Comments: Barrel chested  Abdominal:      General: There is no distension. Musculoskeletal: Normal range of motion. General: No deformity. Skin:     General: Skin is warm and dry. Neurological:      General: No focal deficit present. Mental Status: He is alert and oriented to person, place, and time. Cranial Nerves: No cranial nerve deficit. Motor: No abnormal muscle tone.       Comments: Generalized weakness  Psychiatric:         Behavior: Behavior normal.         Thought Content: Thought content normal.         Judgment: Judgment normal.       Medications:      sodium chloride 100 mL/hr at 02/04/20 1130      doxycycline (VIBRAMYCIN) IV  100 mg Intravenous Q12H    methylPREDNISolone  40 mg Intravenous Q12H    sodium chloride flush  10 mL Intravenous 2 times per day    enoxaparin  40 mg Subcutaneous Daily    diltiazem  300 mg Oral Daily    docusate sodium  100 mg Oral Daily    fluticasone-umeclidin-vilant  1 puff Inhalation Daily    montelukast  10 mg Oral Nightly    pantoprazole  40 mg Oral QAM AC    magnesium citrate  296 mL Oral Once    ipratropium-albuterol  1 ampule Inhalation Q4H WA    hydrOXYzine  25 mg Oral TID     LORazepam, ketorolac, sodium chloride flush, magnesium hydroxide, ondansetron, potassium chloride **OR** potassium alternative oral replacement **OR** potassium chloride, acetaminophen, ipratropium-albuterol  DIET GENERAL;         Lab and other Data:     Recent Labs     02/03/20  1133 02/04/20  0434   WBC 17.0* 17.6*   HGB 10.6* 10.9*    233     Recent Labs     02/03/20  1133 02/04/20  0434    143   K 3.3* 2.9*    101   CO2 24 27   BUN 13 12   CREATININE 0.5 <0.5   GLUCOSE 337* 172*     No results for input(s): AST, ALT, ALB, BILITOT, ALKPHOS in the last 72 hours. Troponin T:   Recent Labs     02/03/20  1133   TROPONINI <0.01     Pro-BNP: No results for input(s): BNP in the last 72 hours. INR: No results for input(s): INR in the last 72 hours. ABGs:   Lab Results   Component Value Date    PHART 7.510 02/01/2020    PO2ART 158.0 02/01/2020    NQX8KCJ 34.0 02/01/2020     UA:  No results for input(s): NITRITE, COLORU, PHUR, LABCAST, WBCUA, RBCUA, MUCUS, TRICHOMONAS, YEAST, BACTERIA, CLARITYU, SPECGRAV, LEUKOCYTESUR, UROBILINOGEN, BILIRUBINUR, BLOODU, GLUCOSEU, AMORPHOUS in the last 72 hours. Invalid input(s): KETONESU    Imaging:   XR CHEST PORTABLE   Final Result   1. Severe chronic lung changes.

## 2020-02-04 NOTE — CARE COORDINATION
CM attempted to see patient. Patient on Trilogy, and able to talk with mask on, but deferred assessment until Krystal Prather, , could be present. Patient reporting, he is too \"short of breath\" to talk. Patient states, he lives with sister. [facility], sister, [phone]. Krystal Prather reports, that patient had been hosptialized sometime prior in [facility]. He was discharged to his home in [place], [place] with homecare, 35 hours of non-skilled care, and her help when she could visit. Krystal Prather lives in [facility]. Patient was only home for a short time. He then went to [facility] in [place], [place]. Krystal Prather believes patient was in hospice at this time. Krystal Prather was not satisfied with patient's care at the facility and brought patient to live with her in [place], [place]. Per Krystal Prather, it is more than one person can do. Initially patient was doing better, walking, and using Rolator . Apparently, he fell on 1/28/2020 in the home, and later in the week became more ill until she brought him to the ER. Krystal Prather, states, she is POA, and has ability to write checks. She and patient are concerned about \"insurance\" covering his admission in the hospital. Patient has what appears to be Medicare/[employer] insurance. Patient was not able to work with OT today. Sister reports, she is coming in to feed patient his meals, \"it takes him so long to eat\" Krystal Prather does not feel patient will be accepting of SNF. Krystal Prather is hoping to see Pulmonologist to understand more fully patient's status. CM outreached to Pulmonlogist's office, and advised sister, Pulmonology to see patient sometime in the morning. CM has outreached to [place] [id number] Counselor to obtain process for sister to switch to [employer]. CM outreached to PT to assist with recommendations for discharge. CM has made referral to Palliative care. Patient states, the Trilogy at the bedside.   The recommendation at this time is SNF, will need to see if patient can

## 2020-02-04 NOTE — PROGRESS NOTES
Physical Therapy    Facility/Department: [facility] ONCOLOGY UNIT  Initial Assessment    NAME: Vicki Hamilton  : 1955  MRN: [id number]    Date of Service: 2020    Discharge Recommendations:  Continue to assess pending progress, Patient would benefit from continued therapy after discharge        Assessment   Body structures, Functions, Activity limitations: Decreased functional mobility ; Decreased strength;Decreased safe awareness;Decreased endurance;Decreased balance;Decreased posture  Assessment: Pt. a fall risk and should not attempt mobility on his own at this time. Anticipate pt. will need 24 hr care upon d/c from [facility], as he is unable to care for himself due to debility that his respiratory status has caused. Pt. needs to take rest breaks between activities, as to not be rushed and in order to recover from each activity. Pt. only needs to perform short activities in order to conserve energy. Safest way to transfer pt. is with gait belt and 2A. Treatment Diagnosis: deconditioning  Prognosis: Guarded  Decision Making: Medium Complexity  PT Education: Goals;PT Role;Plan of Care;General Safety;Transfer Training;Precautions; Adaptive Device Training;Functional Mobility Training  Barriers to Learning: none noted  REQUIRES PT FOLLOW UP: Yes  Activity Tolerance  Activity Tolerance: Patient limited by endurance; Patient limited by fatigue       Patient Diagnosis(es): The primary encounter diagnosis was COPD exacerbation ([facility] [address] Diagnoses of Chronic respiratory failure with hypoxia ([address] H/O alpha-1-antitrypsin deficiency, and Sepsis without acute organ dysfunction, due to unspecified organism [facility]) were also pertinent to this visit. has a past medical history of COPD (chronic obstructive pulmonary disease) ([address] Emphysema due to alpha-1-antitrypsin deficiency ([address] GERD without esophagitis, and Palliative care patient.    has no past surgical history on file.    Restrictions  Restrictions/Precautions  Restrictions/Precautions: Fall Risk  Required Braces or Orthoses?: No  Vision/Hearing        Subjective  General  Chart Reviewed: Yes  Patient assessed for rehabilitation services?: Yes  Response To Previous Treatment: Not applicable  Family / Caregiver Present: Yes(nursing student)  Referring Practitioner: Dr. Radha Griffiths  Referral Date : 02/02/20  Diagnosis: COPD exacerbation, chronic respiratory failure with hypoxia, sepsis, H/O alpha-1-antitrypsin deficiency   Follows Commands: Impaired  Other (Comment): needs increased time to follow commands due to respiratory status  General Comment  Comments: RN, Alexx Colbert PT. Subjective  Subjective: Pt. hesitant but willing to work with therapy. While seated EOB, pt. states, \"I don't know if this is worth it. \"  Pain Screening  Patient Currently in Pain: No  Pain Assessment  Non-Pharmaceutical Pain Intervention(s): Ambulation/Increased Activity;Repositioned  Vital Signs  Patient Currently in Pain: No  Oxygen Therapy  SpO2: 96 %  O2 Device: Nasal cannula  O2 Flow Rate (L/min): 4 L/min  Patient Observation  Observations: pt. with labored breathing throughout PT eval, but his sats remained 96%  Pre Treatment Pain Screening  Intervention List: Patient able to continue with treatment    Orientation     Social/Functional History  Social/Functional History  Additional Comments: Per chart has been in a facility and has stayed with his sister. Pt. unable to relate much of his prior history due to difficulty breathing.   Cognition   Cognition  Overall Cognitive Status: WFL    Objective     Observation/Palpation  Posture: Fair  Body Mechanics: increased kyphotic T spine    AROM RLE (degrees)  RLE AROM: WFL  AROM LLE (degrees)  LLE AROM : WFL  Strength RLE  Strength RLE: Exception  Comment: at least 3/5  Strength LLE  Strength LLE: Exception  Comment: at least 3/5        Bed mobility  Supine to Sit: Minimal assistance  Transfers  Sit to Stand: Minimal Assistance; Moderate Assistance  Stand to sit: Minimal Assistance; Moderate Assistance  Bed to Chair: Minimal assistance; Moderate assistance  Comment: able to take a few steps from bed to chair  Ambulation  Ambulation?: Yes  WB Status: no restrictions  Ambulation 1  Surface: level tile  Device: No Device  Assistance: Minimal assistance; Moderate assistance  Quality of Gait: unsteady  Gait Deviations: Slow [place];Decreased step length;Decreased step height  Distance: 2' from bed to chair  Comments: pt. sat on EOB x 4-5 mins prior to standing to transfer to chair     Balance  Posture: Fair  Sitting - Static: Fair  Sitting - Dynamic: Fair  Standing - Static: Poor  Standing - Dynamic: Poor        Plan   Plan  Times per week: 3-7  Times per day: Daily  Plan weeks: 2  Current Treatment Recommendations: Strengthening, Balance Training, Functional Mobility Training, Transfer Training, Gait Training, Safety Education & Training, Positioning, Equipment Evaluation, Education, & procurement, Patient/Caregiver Education & Training, Endurance Training  Plan Comment: cont. PT per POC.   Safety Devices  Type of devices: Gait belt, Patient at risk for falls, Nurse notified, Left in chair, Chair alarm in place, Call light within reach(nursing student present)    G-Code       OutComes Score                                                  AM-PAC Score             Goals  Short term goals  Time Frame for Short term goals: 2 wks  Short term goal 1: supine to sit indep  Short term goal 2: sit to stand indep  Short term goal 3: amb. 25' with RW SBA  Short term goal 4: bed to chair indep  Patient Goals   Patient goals : go home       Therapy Time   Individual Concurrent Group Co-treatment   Time In           Time Out           Minutes                   Matilde Salazar PT    Electronically signed by Matilde Salazar PT on 2/4/2020 at 11:19 AM

## 2020-02-04 NOTE — PROGRESS NOTES
Pulmonary and Critical Care Progress Note [facility]    Patient: Judy Dee  1955   MR# [id number]   Acct# [de-identified]  02/04/20   8:35 AM  Referring Provider: Amy May MD    Chief Complaint: COPD    Interval history: Patient continues to complain of moderate, assistant shortness of breath in the mid chest for now days, aggravated by exertion and Deep breathing and alleviated by rest, oxygen and trilogy machine, and associated with Chest pain on deep breathing. Rodrigo Car He reports he is feeling much better today than yesterday. He is breathing comfortably on his trilogy device in the bed. He is receiving maintenance IV fluids. He indicates he has not had his alpha-1 antitrypsin infusion in approximately 4 to 5 weeks. No family at the bedside. Medications:   doxycycline (VIBRAMYCIN) IV, 100 mg, Intravenous, Q12H    methylPREDNISolone, 40 mg, Intravenous, Q12H    sodium chloride flush, 10 mL, Intravenous, 2 times per day    enoxaparin, 40 mg, Subcutaneous, Daily    diltiazem, 300 mg, Oral, Daily    docusate sodium, 100 mg, Oral, Daily    fluticasone-umeclidin-vilant, 1 puff, Inhalation, Daily    montelukast, 10 mg, Oral, Nightly    pantoprazole, 40 mg, Oral, QAM AC    magnesium citrate, 296 mL, Oral, Once    ipratropium-albuterol, 1 ampule, Inhalation, Q4H WA    hydrOXYzine, 25 mg, Oral, TID   Review of Systems: Review of Systems   Constitutional: Negative for fever. HENT: Positive for congestion. Respiratory: Positive for cough, shortness of breath and wheezing. Negative for choking. Gastrointestinal: Negative for diarrhea, nausea and vomiting. Neurological: Positive for weakness. Physical Exam:  Blood pressure 123/77, pulse 72, temperature 96.8 °F (36 °C), temperature source Temporal, resp. rate 20, height 6' 1\" (1.854 m), weight 123 lb 3 oz (55.9 kg), SpO2 97 %.     Intake/Output Summary (Last 24 hours) at 2/4/2020 9897  Last data filed at 2/4/2020 0755  Gross per 24

## 2020-02-04 NOTE — CONSULTS
MG/3ML SOLN nebulizer solution ipratropium 0.5 mg-albuterol 3 mg (2.5 mg base)/3 mL nebulization soln   USE 1 VIAL IN NEBULIZER 4 TIMES A DAY. J44.9   Yes Historical Provider, MD   predniSONE (DELTASONE) 10 MG tablet prednisone 10 mg tablet   TAKE 2 TABLETS BY MOUTH TWICE A DAY WITH MEALS FOR 30 DAYS   Yes Historical Provider, MD   montelukast (SINGULAIR) 10 MG tablet montelukast 10 mg tablet   TAKE 1 TABLET BY MOUTH EVERY DAY   Yes Historical Provider, MD   albuterol-ipratropium (COMBIVENT RESPIMAT)  MCG/ACT AERS inhaler Combivent Respimat 20 mcg-100 mcg/actuation solution for inhalation   INHALE 1 PUFF BY MOUTH 4 TIMES A DAY   Yes Historical Provider, MD   fluticasone-umeclidin-vilant (TRELEGY ELLIPTA) 100-62.5-25 MCG/INH AEPB Trelegy Ellipta 100 mcg-62.5 mcg-25 mcg powder for inhalation   INHALE 1 PUFF BY MOUTH EVERY DAY   Yes Historical Provider, MD   docusate sodium (COLACE) 100 MG capsule Take 100 mg by mouth daily   Yes Historical Provider, MD   diltiazem (DILTIAZEM CD) 300 MG extended release capsule diltiazem  mg capsule,extended release 24 hr   TAKE 1 CAPSULE BY MOUTH EVERY DAY*STOP AMLODIPINE*   Yes Historical Provider, MD   omeprazole (PRILOSEC) 20 MG delayed release capsule omeprazole 20 mg capsule,delayed release   TAKE 1 CAPSULE BY MOUTH EVERY DAY   Yes Historical Provider, MD   Multiple Vitamins-Minerals (CENTRUM SILVER 50+MEN PO) Take by mouth   Yes Historical Provider, MD   Dextromethorphan-guaiFENesin ([address] DM MAXIMUM STRENGTH)  MG TB12 Take by mouth   Yes Historical Provider, MD        Facility Administered Medications:    doxycycline (VIBRAMYCIN) IV  100 mg Intravenous Q12H    methylPREDNISolone  40 mg Intravenous Q12H    sodium chloride flush  10 mL Intravenous 2 times per day    enoxaparin  40 mg Subcutaneous Daily    diltiazem  300 mg Oral Daily    docusate sodium  100 mg Oral Daily    fluticasone-umeclidin-vilant  1 puff Inhalation Daily    montelukast  10 mg oz (55.9 kg)   SpO2 97%   BMI 16.25 kg/m²     GENERAL - well developed and well nourished, in severe amount of generalized distress; is somewhat an active participant in this examination  HEENT -  PERRLA, Hearing appears normal, conjunctiva and lids are normal, ears and nose appear normal  NECK - no thyromegaly, no JVD, trachea is in the midline  CARDIOVASCULAR - PMI is in the left mid line clavicular position, Normal S1 and S2 with a grade 1/6 systolic murmur. No S3 or S4    PULMONARY - Yes: severe respiratory distress. scattered wheezes and rales. Breath sounds in both  lung fields are Decreased  ABDOMEN  - soft, non tender, no rebound, no hepatomegaly or splenomegaly  MUSCULOSKELETAL  - Prone/Supine, digitals and nails are without clubbing or cyanosis  EXTREMITIES - trace edema  NEUROLOGIC - cranial nerves, II-XII, are normal  SKIN - turgor is normal, no rash  PSYCHIATRIC - was normal mood and affect, alert and orientated x 3, judgement and insight appear appropriate      LABORATORY EVALUATION & TESTING:    I have personally reviewed and interpreted the results of the following diagnostic testing      EKG and or Telemetry:  which was personally reviewed me:  Sinus rhythm,   Pulse Readings from Last 1 Encounters:   02/03/20 67    bpm,  without Acute changes    Troponin:  negative myocardial necrosis (  <0.01); the creatinine is normal    CBC:   Recent Labs     02/01/20  0006 02/01/20  0427 02/03/20  1133   WBC 24.3* 19.8* 17.0*   HGB 13.3* 12.5* 10.6*   HCT 41.8* 40.7* 33.4*   MCV 93.9 95.8* 93.0    283 233     BMP:   Recent Labs     02/01/20  0006 02/01/20  0327 02/01/20  0427 02/03/20  1133   *  --  139 145   K 4.9 4.6 4.7 3.3*   CL 95*  --  95* 104   CO2 25  --  27 24   BUN 20  --  19 13   CREATININE <0.5  --  0.5 0.5     Cardiac Enzymes:   Recent Labs     02/03/20  1133   TROPONINI <0.01     PT/INR: No results for input(s): PROTIME, INR in the last 72 hours.   APTT: No results for input(s): APTT in the last 72 hours. Liver Profile:  Lab Results   Component Value Date    AST 29 02/01/2020    ALT 43 02/01/2020    BILITOT 0.8 02/01/2020    ALKPHOS 125 02/01/2020   No results found for: CHOL, HDL, TRIG  TSH:  No results found for: TSH  UA:   Lab Results   Component Value Date    COLORU Yellow 02/01/2020    PHUR 6.0 02/01/2020    CLARITYU Clear 02/01/2020    SPECGRAV 1.025 02/01/2020    LEUKOCYTESUR Negative 02/01/2020    UROBILINOGEN 4.0 02/01/2020    BILIRUBINUR Negative 02/01/2020    BLOODU Negative 02/01/2020    GLUCOSEU Negative 02/01/2020             ALL THE CARDIOLOGY PROBLEMS ARE LISTED ABOVE; HOWEVER, THE FOLLOWING SPECIFIC CARDIAC PROBLEMS WERE ADDRESSED AND  TREATED DURING THE HOSPITAL     MEDICAL DECISION MAKING               Cardiac Specific Problem / Diagnosis  Discussion and Data Reviewed Diagnostic or Therapeutic Procedures Ordered Management Options Selected           1. Presenting problem / symptom    Severe dyspnea  show no change   As per hospitalist and pulmonary    Will check and echo    Due to his severe pulmonary disease, do not believe he a candidate for an ischemic myocardial workup Yes: echo Continue current medications:     Yes:            2. Chest discomfort Initial presentation during this evaluation   The pain is slightly worse with a deep breath, as noted above, I really dont believe is a candidate for an ischemic evaluation. Yes: echo Continue current medications:    Yes:            3. Concern regarding systemic blood pressure Initial presentation during this evaluation Systolic (62VAZ), LSJ:292 , Min:137 , MMN:711    Diastolic (44XPF), BED:76, Min:71, Max:89   No Continue current medications:       yes         DISCUSSION AND PLAN:      1. I had a detailed discussion with the patient and / or family regarding the historical points, physical examination findings, and any diagnostic results supporting the admission / consultation diagnosis.  The patient was educated on care and need for admission. Questions were invited and answered. The patient and / or family shows understanding of admission / consultation information and agrees to follow. 2. Continue present medications except for changes as noted above    3. Continue to monitor rhythm    4. Further orders per clinical course. 5. echocardiogram    I have discussed the risks, benefits and options with the patient and his family. They appear to understand, have no questions, and wish to proceed. This procedure is scheduled for 02/04/20      Discussed with patient and nursing.     I greatly appreciate the opportunity and your confidence in allowing me to participate in the care of Cas Rae    Electronically signed by Germaine Encinas MD on 2/3/20     [facility] [place]

## 2020-02-04 NOTE — PROGRESS NOTES
Palliative care met with pt and his sister, [facility]. Supportive care and listening presence provided to family. Family talks with me about their experiences over the past month. Pt lives in [place] but has become sick and too weak to care for himself. His sister, [facility] has moved him into her home to care for him. Pt was in a facility in [place] but sister felt it was best to bring him home with her d/t personal reasons. She tells me pt has had COPD for several yrs. He was living at his home in [place] until about a month ago when he became ill and was taken to the hospital and found to have sepsis. From there pt did return home with [facility] services, sister states he began needing more help. At that time he was placed in facility for rehab, after 3 days she felt pt was not getting adequate care, this is when sister brought him home with her. She is concerned about pt getting medicaid now that he is here with sister. We discussed different goals of care such as Rehab, HH, and hospice in the future should pt not recover and get well. Sister was attentive and considering all options at this time. She is asking for hospital bed in order to better care for him at home. Pt has SW consult, they have been working with pt/family as well. Palliative care will follow for support, ongoing discussions of goals of care.       Electronically signed by Long Waddell RN on 2/4/2020 at 2:49 PM

## 2020-02-04 NOTE — PLAN OF CARE
Problem: Falls - Risk of:  Goal: Will remain free from falls  Description  Will remain free from falls  2/4/2020 1150 by Deborah Cavazos RN  Outcome: Ongoing  2/4/2020 0551 by uJliana Collins RN  Outcome: Ongoing  Goal: Absence of physical injury  Description  Absence of physical injury  2/4/2020 1150 by Deborah Cavazos RN  Outcome: Ongoing  2/4/2020 0551 by Juliana Collins RN  Outcome: Ongoing     Problem:  Activity:  Goal: Fatigue will decrease  Description  Fatigue will decrease  2/4/2020 1150 by Deborah Cavazos RN  Outcome: Ongoing  2/4/2020 0551 by Juliana Collins RN  Outcome: Ongoing  Goal: Ability to tolerate increased activity will improve  Description  Ability to tolerate increased activity will improve  2/4/2020 1150 by Deborah Cavazos RN  Outcome: Ongoing  2/4/2020 0551 by Juliana Collins RN  Outcome: Ongoing     Problem: Cardiac:  Goal: Hemodynamic stability will improve  Description  Hemodynamic stability will improve  2/4/2020 1150 by Deborah Cavazos RN  Outcome: Ongoing  2/4/2020 0551 by Juliana Collins RN  Outcome: Ongoing     Problem: Coping:  Goal: Level of anxiety will decrease  Description  Level of anxiety will decrease  2/4/2020 1150 by Deborah Cavazos RN  Outcome: Ongoing  2/4/2020 0551 by Juliana Collins RN  Outcome: Ongoing  Goal: Ability to cope will improve  Description  Ability to cope will improve  2/4/2020 1150 by Deborah Cavazos RN  Outcome: Ongoing  2/4/2020 0551 by Juliana Collins RN  Outcome: Ongoing  Goal: Ability to establish a method of communication will improve  Description  Ability to establish a method of communication will improve  2/4/2020 1150 by Deborah Cavazos RN  Outcome: Ongoing  2/4/2020 0551 by Juliana Collins RN  Outcome: Ongoing     Problem: Nutritional:  Goal: Consumption of the prescribed amount of daily calories will improve  Description  Consumption of the prescribed amount of daily calories will improve  2/4/2020 1150 by Giovanni Allred bleeding  Description  Will show no signs and symptoms of excessive bleeding  2/4/2020 1150 by Tima Garcia RN  Outcome: Ongoing  2/4/2020 0551 by Aline Dunn RN  Outcome: Ongoing     Problem: Discharge Planning:  Goal: Discharged to appropriate level of care  Description  Discharged to appropriate level of care  2/4/2020 1150 by Tima Garcia RN  Outcome: Ongoing  2/4/2020 0551 by Aline Dunn RN  Outcome: Ongoing     Problem: Risk for Impaired Skin Integrity  Goal: Tissue integrity - skin and mucous membranes  Description  Structural intactness and normal physiological function of skin and  mucous membranes.   2/4/2020 1150 by Tima Garcia RN  Outcome: Ongoing  2/4/2020 0551 by Aline Dnun RN  Outcome: Ongoing

## 2020-02-04 NOTE — PLAN OF CARE
Problem: Falls - Risk of:  Goal: Will remain free from falls  Description  Will remain free from falls  Outcome: Ongoing  Goal: Absence of physical injury  Description  Absence of physical injury  Outcome: Ongoing     Problem:  Activity:  Goal: Fatigue will decrease  Description  Fatigue will decrease  Outcome: Ongoing  Goal: Ability to tolerate increased activity will improve  Description  Ability to tolerate increased activity will improve  Outcome: Ongoing     Problem: Cardiac:  Goal: Hemodynamic stability will improve  Description  Hemodynamic stability will improve  Outcome: Ongoing     Problem: Coping:  Goal: Level of anxiety will decrease  Description  Level of anxiety will decrease  Outcome: Ongoing  Goal: Ability to cope will improve  Description  Ability to cope will improve  Outcome: Ongoing  Goal: Ability to establish a method of communication will improve  Description  Ability to establish a method of communication will improve  Outcome: Ongoing     Problem: Nutritional:  Goal: Consumption of the prescribed amount of daily calories will improve  Description  Consumption of the prescribed amount of daily calories will improve  Outcome: Ongoing     Problem: Respiratory:  Goal: Ability to maintain a clear airway will improve  Description  Ability to maintain a clear airway will improve  Outcome: Ongoing  Goal: Ability to maintain adequate ventilation will improve  Description  Ability to maintain adequate ventilation will improve  Outcome: Ongoing  Goal: Complications related to the disease process, condition or treatment will be avoided or minimized  Description  Complications related to the disease process, condition or treatment will be avoided or minimized  Outcome: Ongoing     Problem: Skin Integrity:  Goal: Risk for impaired skin integrity will decrease  Description  Risk for impaired skin integrity will decrease  Outcome: Ongoing     Problem: Breathing Pattern - Ineffective:  Goal: Ability to achieve and maintain a regular respiratory rate will improve  Description  Ability to achieve and maintain a regular respiratory rate will improve  Outcome: Ongoing     Problem: Infection:  Goal: Will remain free from infection  Description  Will remain free from infection  Outcome: Ongoing     Problem: Safety:  Goal: Free from accidental physical injury  Description  Free from accidental physical injury  Outcome: Ongoing  Goal: Free from intentional harm  Description  Free from intentional harm  Outcome: Ongoing     Problem: Daily Care:  Goal: Daily care needs are met  Description  Daily care needs are met  Outcome: Ongoing     Problem: Pain:  Goal: Patient's pain/discomfort is manageable  Description  Patient's pain/discomfort is manageable  Outcome: Ongoing  Goal: Pain level will decrease  Description  Pain level will decrease  Outcome: Ongoing  Goal: Control of acute pain  Description  Control of acute pain  Outcome: Ongoing  Goal: Control of chronic pain  Description  Control of chronic pain  Outcome: Ongoing     Problem: Skin Integrity:  Goal: Skin integrity will stabilize  Description  Skin integrity will stabilize  Outcome: Ongoing     Problem: Discharge Planning:  Goal: Patients continuum of care needs are met  Description  Patients continuum of care needs are met  Outcome: Ongoing     Problem: ABCDS Injury Assessment  Goal: Absence of physical injury  Outcome: Ongoing     Problem: Bleeding:  Goal: Will show no signs and symptoms of excessive bleeding  Description  Will show no signs and symptoms of excessive bleeding  Outcome: Ongoing     Problem: Discharge Planning:  Goal: Discharged to appropriate level of care  Description  Discharged to appropriate level of care  Outcome: Ongoing     Problem: Risk for Impaired Skin Integrity  Goal: Tissue integrity - skin and mucous membranes  Description  Structural intactness and normal physiological function of skin and  mucous membranes.   Outcome: Ongoing

## 2020-02-04 NOTE — PROGRESS NOTES
Occupational Therapy   Occupational Therapy Initial Assessment  Date: 2020   Patient Name: Vicki Hamilton  MRN: [id number]     : 1955    Date of Service: 2020    Discharge Recommendations:  Patient would benefit from continued therapy after discharge       Assessment   Assessment: Evaluation completed and tx initiated. The patient is significantly limited for activity tolerance and most of his efforts go to breathing efforts. Treatment Diagnosis: SOB, Sepsis, Generalized weakness  REQUIRES OT FOLLOW UP: Yes  Activity Tolerance  Activity Tolerance: Treatment limited secondary to medical complications   Activity Tolerance: limited by respiration. Pulse ox remains in the s  Safety Devices  Safety Devices in place: Yes  Type of devices: Call light within reach; Left in chair(nurse notified I was unable to locate a personal alarm.  )           Patient Diagnosis(es): The primary encounter diagnosis was COPD exacerbation ([facility] Diagnoses of Chronic respiratory failure with hypoxia ([facility] [address] H/O alpha-1-antitrypsin deficiency, and Sepsis without acute organ dysfunction, due to unspecified organism [facility]) were also pertinent to this visit. has a past medical history of COPD (chronic obstructive pulmonary disease) ([facility] [address] Emphysema due to alpha-1-antitrypsin deficiency ([facility] [address] [facility] GERD without esophagitis, and Palliative care patient. has no past surgical history on file.     Treatment Diagnosis: SOB, Sepsis, Generalized weakness      Restrictions  Restrictions/Precautions  Restrictions/Precautions: Fall Risk  Required Braces or Orthoses?: No    Subjective   General  Chart Reviewed: Yes  Patient assessed for rehabilitation services?: Yes  Family / Caregiver Present: No  General Comment  Comments: Per nursing, patient's sister has been coming to the hospital to help feed him  Patient Currently in Pain: No  Pain Assessment  Pain Assessment: 0-10  Pain Level: 7  Pain Type: Chronic pain  Pain Location: Back  Pain

## 2020-02-05 ENCOUNTER — OUTSIDE FACILITY SERVICE (OUTPATIENT)
Dept: PULMONOLOGY | Facility: CLINIC | Age: 65
End: 2020-02-05

## 2020-02-05 PROCEDURE — 99232 SBSQ HOSP IP/OBS MODERATE 35: CPT | Performed by: INTERNAL MEDICINE

## 2020-02-05 NOTE — PROGRESS NOTES
impressions. Essential elements of the care plan were discussed with APRN above. Agree with findings and assessment/plan as documented above.     Electronically signed by Jess Berman, on 2/5/2020, 5:25 PM

## 2020-02-05 NOTE — PLAN OF CARE
RN  Outcome: Ongoing     Problem: Discharge Planning:  Goal: Discharged to appropriate level of care  Description  Discharged to appropriate level of care  2/5/2020 1739 by Ora Miner RN  Outcome: Ongoing  2/5/2020 0634 by Brendan Sears RN  Outcome: Ongoing     Problem: Risk for Impaired Skin Integrity  Goal: Tissue integrity - skin and mucous membranes  Description  Structural intactness and normal physiological function of skin and  mucous membranes.   2/5/2020 1739 by Ora Miner RN  Outcome: Ongoing  2/5/2020 0634 by Brendan Sears RN  Outcome: Ongoing

## 2020-02-06 ENCOUNTER — OUTSIDE FACILITY SERVICE (OUTPATIENT)
Dept: PULMONOLOGY | Facility: CLINIC | Age: 65
End: 2020-02-06

## 2020-02-06 PROCEDURE — 99232 SBSQ HOSP IP/OBS MODERATE 35: CPT | Performed by: INTERNAL MEDICINE

## 2020-02-06 NOTE — CARE COORDINATION
SHANE spoke w/ pt and pt's sister at bedside. In regards to insurance, pt's sister is working towards getting his coverage switched over. She has gone to social security office and working w/ insurance company to begin this process. At this time, plan is for pt to dc home w/ sister in [place]. Pt already had a DME company/supplier in [place] for his trilogy machine. Pt uses [employer]. out of [place], [place]; there is also an office here in [facility]. SHANE contacted [employer] and spoke to Caterina. Confirmed pt is current w/ their company. Notified of possible order coming through for hospital bed. Caterina will be on the look out for order, SHANE will follow up. SHANE reached out to MD to notify of the above.  Will follow    [employer] P: [phone] F: [phone]    Electronically signed by Corinna Mata on 2/6/2020 at 2:09 PM

## 2020-02-06 NOTE — PROGRESS NOTES
Hospitalist Progress Note  2/6/2020 9:06 AM  Subjective:   Admit Date: 1/31/2020  PCP: David Lao MD    Chief Complaint: shortness of breath    Subjective: Patient states he is feeling a little better. Plans to discharge to his sister's house without hospice services on stabilization. Denies any new complaints, history is otherwise unchanged. Cumulative Hospital History:   2-1: Presents to ED with worsening shortness of breath x1-2 days with chest tightness. Denies cough, fever, or chills. Alpha-1 antitrypsin deficiency, has Trilogy machine. Recently hospitalized at home in [place], discharged to SNF but his sister brought him to her house after 3 days due to concerns with the care he was receiving. Lactic acid elevated, given IV fluids and admitted on azithromycin and ceftriaxone. Pulmonology consult, added hydroxyzine and increased corticosteroid treatment. 2-2: Feeling very weak. Some chest pain, echo ordered. Antibiotics changed to doxycycline. 2-3: Cardiology consulted for chest pain, echo ordered. Case management consulted. Patient felt to need SNF placement but not agreeable and has insurance still in [place]. 2-4: Patient breathing heavily with apparent panic attack. Diltiazem discontinued for ACEI.  2-5: Breathing comfortably and improved. 2-6: Patient has decided on eventual discharge to stay with his sister without hospice services. Case management working on the equipment he needs but this may be complicated by his insurance situation. ROS: 14 point review of systems is negative except as specifically addressed above. DIET GENERAL;     Intake/Output Summary (Last 24 hours) at 2/6/2020 0906  Last data filed at 2/6/2020 0529  Gross per 24 hour   Intake 2287 ml   Output 3200 ml   Net -913 ml     Medications:   sodium chloride 100 mL/hr at 02/05/20 6533     Current Facility-Administered Medications   Medication Dose Route Frequency Provider Last Rate Last Dose    LORazepam (ATIVAN) injection 0.5 mg  0.5 mg Intravenous Q6H PRN Jose Dubois MD   0.5 mg at 02/04/20 1047    sodium chloride flush 0.9 % injection 10 mL  10 mL Intravenous 2 times per day Jose Dubois MD   10 mL at 02/05/20 1928    sodium chloride flush 0.9 % injection 10 mL  10 mL Intravenous PRN Jose Dubois MD        lisinopril (PRINIVIL;ZESTRIL) tablet 10 mg  10 mg Oral Daily Diana Najera MD   10 mg at 02/06/20 0844    doxycycline (VIBRAMYCIN) 100 mg in dextrose 5 % 100 mL IVPB  100 mg Intravenous Q12H Jose Dubois MD   Stopped at 02/06/20 0038    ketorolac (TORADOL) injection 30 mg  30 mg Intravenous Q6H PRN Jose Dubois MD   30 mg at 02/06/20 0423    methylPREDNISolone sodium (SOLU-MEDROL) injection 40 mg  40 mg Intravenous Q12H Jodi Costa MD   40 mg at 02/06/20 0423    magnesium hydroxide (MILK OF MAGNESIA) 400 MG/5ML suspension 30 mL  30 mL Oral Daily PRN Doss Angelucci, MD        ondansetron [facility]) injection 4 mg  4 mg Intravenous Q6H PRN Doss Angelucci, MD        enoxaparin (LOVENOX) injection 40 mg  40 mg Subcutaneous Daily Doss Angelucci, MD   40 mg at 02/06/20 0845    potassium chloride (KLOR-CON M) extended release tablet 40 mEq  40 mEq Oral PRN Doss Angelucci, MD        Or    potassium bicarb-citric acid (EFFER-K) effervescent tablet 40 mEq  40 mEq Oral PRN Doss Angelucci, MD        Or    potassium chloride 10 mEq/100 mL IVPB (Peripheral Line)  10 mEq Intravenous PRN Doss Angelucci,  mL/hr at 02/04/20 1716 10 mEq at 02/04/20 1716    acetaminophen (TYLENOL) tablet 650 mg  650 mg Oral Q6H PRN Doss Angelucci, MD        ipratropium-albuterol (DUONEB) nebulizer solution 1 ampule  1 ampule Inhalation Q4H PRN Doss Angelucci, MD   1 ampule at 02/01/20 1217    docusate sodium (COLACE) capsule 100 mg  100 mg Oral Daily Doss Angelucci, MD   100 mg at 02/06/20 0845    fluticasone-umeclidin-vilant (TRELEGY ELLIPTA) 100-62.5-25 MCG/INH inhaler 1 puff  1 puff Inhalation Daily Barbara Escobar MD   1 puff at 02/03/20 0949    montelukast (SINGULAIR) tablet 10 mg  10 mg Oral Nightly Barbara Escobar MD   10 mg at 02/05/20 1929    pantoprazole (PROTONIX) tablet 40 mg  40 mg Oral QAM AC Barbara Escobar MD   40 mg at 02/06/20 0739    magnesium citrate solution 296 mL  296 mL Oral Once Barbara Escobar MD        0.9 % sodium chloride infusion   Intravenous Continuous Barbara Escobar  mL/hr at 02/05/20 0808      ipratropium-albuterol (DUONEB) nebulizer solution 1 ampule  1 ampule Inhalation Q4H WA Martha Cohen MD   1 ampule at 02/06/20 9067    hydrOXYzine (ATARAX) tablet 25 mg  25 mg Oral TID CARLOS Edge   25 mg at 02/06/20 0844        Labs:     Recent Labs     02/04/20  0434 02/05/20  0440 02/06/20  0427   WBC 17.6* 17.9* 20.1*   RBC 3.64* 3.74* 3.76*   HGB 10.9* 11.1* 11.3*   HCT 33.6* 35.1* 35.1*   MCV 92.3 93.9 93.4   MCH 29.9 29.7 30.1   MCHC 32.4* 31.6* 32.2*    225 220     Recent Labs     02/04/20  0434  02/04/20  1919 02/05/20  0440 02/06/20  0427     --   --  143 143   K 2.9*   < > 4.1 3.9 3.6   ANIONGAP 15  --   --  12 13     --   --  102 103   CO2 27  --   --  29 27   BUN 12  --   --  17 21   CREATININE <0.5  --   --  0.5 <0.5   GLUCOSE 172*  --   --  135* 141*   CALCIUM 8.3*  --   --  8.4* 8.3*    < > = values in this interval not displayed. Recent Labs     02/04/20  1143   MG 1.9     No results for input(s): AST, ALT, ALB, BILITOT, ALKPHOS, ALB in the last 72 hours. ABGs:No results for input(s): PH, PO2, PCO2, HCO3, BE, O2SAT in the last 72 hours. Troponin T:   Recent Labs     02/03/20  1133   TROPONINI <0.01     INR: No results for input(s): INR in the last 72 hours. Lactic Acid: No results for input(s): LACTA in the last 72 hours.     Objective:   Vitals: BP (!) 140/84   Pulse 98   Temp 96.8 °F (36 °C) (Temporal)   Resp 23   Ht 6' 1\" (1.854 m)   Wt 123 lb 3 oz (55.9 kg)   SpO2 100%   BMI

## 2020-02-06 NOTE — CARE COORDINATION
DME equipment order received and sent to pt's DME company.     [employer]. P: [phone] F: [phone]    Electronically signed by Manuel Quinones on 2/6/2020 at 3:06 PM

## 2020-02-06 NOTE — CARE COORDINATION
SW discussed pt's insurance w/ [facility] liaison. She checked w/ home care office and they will be able to accept pt's insurance as it is a regular [address].  Will notify MD.    Electronically signed by Sonia Ferro on 2/6/2020 at 2:16 PM

## 2020-02-06 NOTE — PROGRESS NOTES
Pulmonary and Critical Care Progress Note [facility]    Patient: Jeanie Cline  1955   MR# [id number]   Acct# [de-identified]  02/06/20   7:55 AM  Referring Provider: Arlette Balderrama DO    Chief Complaint: COPD    Interval history: Patient continues to complain of moderate, assistant shortness of breath in the mid chest for now days, aggravated by exertion and Deep breathing and alleviated by rest, oxygen and trilogy machine, and associated with Chest pain on deep breathing. [facility] He reports he breathing is the same today. He is off of his trilogy this morning and on nasal cannula. He has mild tachypnea. He has been having issues with constipation which was affecting his breathing. He reports having a large bowel movement overnight and this is helped. No new complaints. No family at the bedside. Medications: sodium chloride flush, 10 mL, Intravenous, 2 times per day    lisinopril, 10 mg, Oral, Daily    doxycycline (VIBRAMYCIN) IV, 100 mg, Intravenous, Q12H    methylPREDNISolone, 40 mg, Intravenous, Q12H    enoxaparin, 40 mg, Subcutaneous, Daily    docusate sodium, 100 mg, Oral, Daily    fluticasone-umeclidin-vilant, 1 puff, Inhalation, Daily    montelukast, 10 mg, Oral, Nightly    pantoprazole, 40 mg, Oral, QAM AC    magnesium citrate, 296 mL, Oral, Once    ipratropium-albuterol, 1 ampule, Inhalation, Q4H WA    hydrOXYzine, 25 mg, Oral, TID   Review of Systems: Review of Systems   Constitutional: Negative for fever. HENT: Positive for congestion. Respiratory: Positive for cough, shortness of breath and wheezing. Negative for choking. Gastrointestinal: Positive for constipation. Negative for diarrhea, nausea and vomiting. Neurological: Positive for weakness. Physical Exam:  Blood pressure (!) 140/84, pulse 98, temperature 96.8 °F (36 °C), temperature source Temporal, resp. rate 23, height 6' 1\" (1.854 m), weight 123 lb 3 oz (55.9 kg), SpO2 100 %.     Intake/Output Summary (Last 24 were discussed with APRN above. Agree with findings and assessment/plan as documented above.     Electronically signed by Ubaldo Stone, on 2/6/2020, 12:20 PM

## 2020-02-06 NOTE — PROGRESS NOTES
at 02/05/20 0805    fluticasone-umeclidin-vilant (TRELEGY ELLIPTA) 100-62.5-25 MCG/INH inhaler 1 puff  1 puff Inhalation Daily Radha Marie MD   1 puff at 02/03/20 0949    montelukast (SINGULAIR) tablet 10 mg  10 mg Oral Nightly Radha Marie MD   10 mg at 02/04/20 2217    pantoprazole (PROTONIX) tablet 40 mg  40 mg Oral QAM AC Radha Marie MD   40 mg at 02/05/20 0630    magnesium citrate solution 296 mL  296 mL Oral Once Radha Marie MD        0.9 % sodium chloride infusion   Intravenous Continuous Radha Marie  mL/hr at 02/05/20 0808      ipratropium-albuterol (DUONEB) nebulizer solution 1 ampule  1 ampule Inhalation Q4H WA Nakul Harkins MD   1 ampule at 02/05/20 1835    hydrOXYzine (ATARAX) tablet 25 mg  25 mg Oral TID Lorna Heal, APRN   25 mg at 02/05/20 1307     Allergies: Codeine; Levofloxacin; Penicillins; and Sulfa antibiotics  Past Medical History:   Diagnosis Date    COPD (chronic obstructive pulmonary disease) (HCC)     Emphysema due to alpha-1-antitrypsin deficiency ([facility] [address]     GERD without esophagitis     Palliative care patient 02/03/2020     History reviewed. No pertinent surgical history. History reviewed. No pertinent family history.   Social History     Tobacco Use    Smoking status: Former Smoker    Smokeless tobacco: Never Used   Substance Use Topics    Alcohol use: Never     Frequency: Never          Review of Systems:    General:      Complaint / Symptom Yes / No / Description if Yes       Fatigue Yes:  chronic   Weight gain NA   Insomnia NA       Respiratory:        Complaint / Symptom Yes / No / Description if Yes       Cough No   Horseness NA       Cardiovascular:    Complaint / Symptom Yes / No / Description if Yes       Chest Pain No   Shortness of Air / Orthopnea Yes: chronic and severe   Presyncope / Syncope No   Palpitations No         Objective:    /71   Pulse 82   Temp 98.3 °F (36.8 °C) (Temporal)   Resp 20   Ht 6' 1\" (1.854 m)   Wt 123 lb 3 oz (55.9 kg)   SpO2 97%   BMI 16.25 kg/m² ,     Intake/Output Summary (Last 24 hours) at 2/5/2020 1839  Last data filed at 2/5/2020 1540  Gross per 24 hour   Intake 502 ml   Output 3225 ml   Net -2723 ml       GENERAL - well developed and well nourished, is an active participant in this examination  HEENT -  PERRLA, Hearing appears normal, conjunctiva and lids are normal, ears and nose appear normal  NECK - no thyromegaly, no JVD, trachea is in the midline  CARDIOVASCULAR - PMI is in the left mid line clavicular position, Normal S1 and S2 with a grade 1/6 systolic murmur. No S3 or S4    PULMONARY - Yes: moderate to severe respiratory distress. scattered wheezes and rales. Breath sounds in both  lung fields are Decreased  ABDOMEN  - soft, non tender, no rebound, no hepatomegaly or splenomegaly  MUSCULOSKELETAL  - Prone/Supine, digitals and nails are without clubbing or cyanosis  EXTREMITIES - trace edema  NEUROLOGIC - cranial nerves, II-XII, are normal  SKIN - turgor is normal, no rash  PSYCHIATRIC - normal mood and affect, alert and orientated x 3, judgement and insight appear appropriate      ASSESSMENT:    ALL THE CARDIOLOGY PROBLEMS ARE LISTED ABOVE; HOWEVER, THE FOLLOWING SPECIFIC CARDIAC PROBLEMS / CONDITIONS WERE ADDRESSED AND TREATED DURING THE OFFICE VISIT TODAY:                                                                                            MEDICAL DECISION MAKING                   Cardiac Specific Problem / Diagnosis   Discussion and Data Reviewed Diagnostic or Therapeutic Procedures Ordered Management Options Selected                 1.  Presenting problem / symptom     Severe dyspnea  show no change    As per hospitalist and pulmonary     Will check and echo     Due to his severe pulmonary disease, do not believe he a candidate for an ischemic myocardial workup     Echo 02/04/20     The aortic valve area is 1.6 cm2 with a maximum gradient of 31 mmHg and a   mean

## 2020-02-07 ENCOUNTER — OUTSIDE FACILITY SERVICE (OUTPATIENT)
Dept: PULMONOLOGY | Facility: CLINIC | Age: 65
End: 2020-02-07

## 2020-02-07 PROBLEM — E43 SEVERE MALNUTRITION (HCC): Chronic | Status: ACTIVE | Noted: 2020-01-01

## 2020-02-07 PROCEDURE — 99232 SBSQ HOSP IP/OBS MODERATE 35: CPT | Performed by: INTERNAL MEDICINE

## 2020-02-07 NOTE — CARE COORDINATION
Called pt's sister, Mira Romero, to discuss discharge plans. She is unavailable to discuss plans at this time. Requested to call back later.   Electronically signed by Timi Farris RN on 2/7/2020 at 12:41 PM

## 2020-02-07 NOTE — PROGRESS NOTES
Discussed discharge with Dr. Mercy Causey. Pt is to be discharged once home care services and all supports are set up at home. May not happen until after this weekend.

## 2020-02-07 NOTE — CARE COORDINATION
Spoke with patient's sister, Harvinder Batista, regarding discharge plans. Primitivo Morgan understands that [address] [place] [address] [facility] services will not be available unless patient chooses to self-pay. Private pay caregiver information provided to Primitivo Morgan. She is continuing to apply for [facility] on patient's behalf. She is concerned about her brother losing his home in [place]. At her request, she asked this  to speak to patient about [facility]. Attempted to explain purpose of [place] Medicaid to patient, that it will help provide resources to his sister for his care. Patient immediately stated that he was \"not going to talk about his grandparent's house. It is too upsetting\". After continued attempts to discuss matter with patient, patient continued to not want to discuss subject. Hospital bed is expected to be delivered to patient's sister's home today. Primitivo Morgan is planning on patient discharging to her house tomorrow, 2/8/2020. She is aware that she will be caring for her brother independently. She states, \"I have been taking care of him by myself for the last month. \"    Placed another call to [address] [place], [place] [address]. Left another  message. Awaiting return call. MD informed for plan.   Electronically signed by Manuel Rodarte RN on 2/7/2020 at 3:04 PM

## 2020-02-07 NOTE — PLAN OF CARE
Problem: Nutrition  Goal: Optimal nutrition therapy  Description  Nutrition Problem: Severe malnutrition, In context of chronic illness  Intervention: Food and/or Nutrient Delivery: Continue current diet, Start ONS  Nutritional Goals: PO 50% or more, wt stable or gain     Outcome: Ongoing

## 2020-02-07 NOTE — CARE COORDINATION
Reached out to [facility] Montrell Christy ([phone]). Unable to reach [facility] without a extension number. Called Customer Service at [phone]. Provided Supervisor's name and number, Maryse Nova at [phone]. Attempted to call Haven Stark without success. Left . Awaiting return call.   Electronically signed by Nalini Zelaya RN on 2/7/2020 at 12:56 PM

## 2020-02-07 NOTE — TELEPHONE ENCOUNTER
Rosa, Care Manager called to see if you Dr [facility] would cover his [address] [facility] prior to his new pt appt.

## 2020-02-07 NOTE — TELEPHONE ENCOUNTER
I have let the [facility] liaison Know that he will follow   This is actually pending whether or not he has insurance to cover it or not -  at [facility] are checking on that

## 2020-02-07 NOTE — PROGRESS NOTES
GENERAL;  Dietary Nutrition Supplements: Clear Liquid Oral Supplement    Intake/Output Summary (Last 24 hours) at 2/7/2020 1323  Last data filed at 2/7/2020 1152  Gross per 24 hour   Intake 2336 ml   Output 2650 ml   Net -314 ml     Medications:   sodium chloride 100 mL/hr at 02/07/20 1153     Current Facility-Administered Medications   Medication Dose Route Frequency Provider Last Rate Last Dose    acetaminophen (TYLENOL) tablet 650 mg  650 mg Oral Q6H PRN Caprice Kitten, DO        traMADol (ULTRAM) tablet 50 mg  50 mg Oral Q6H PRN Lawrence Omayra Santos, DO        LORazepam (ATIVAN) injection 0.5 mg  0.5 mg Intravenous Q6H PRN Taras Quijano MD   0.5 mg at 02/07/20 1030    sodium chloride flush 0.9 % injection 10 mL  10 mL Intravenous 2 times per day Taras Quijano MD   Stopped at 02/06/20 2100    sodium chloride flush 0.9 % injection 10 mL  10 mL Intravenous PRN Taras Quijano MD   10 mL at 02/06/20 1718    lisinopril (PRINIVIL;ZESTRIL) tablet 10 mg  10 mg Oral Daily Adia Bautista MD   10 mg at 02/07/20 1028    doxycycline (VIBRAMYCIN) 100 mg in dextrose 5 % 100 mL IVPB  100 mg Intravenous Q12H Taras Quijano MD   Stopped at 02/07/20 1130    methylPREDNISolone sodium (SOLU-MEDROL) injection 40 mg  40 mg Intravenous Q12H Shelia Desai MD   40 mg at 02/06/20 1510    magnesium hydroxide (MILK OF MAGNESIA) 400 MG/5ML suspension 30 mL  30 mL Oral Daily PRN Alex Vences MD        [facility]dansetron [facility]) injection 4 mg  4 mg Intravenous Q6H PRN Alex Vences MD        enoxaparin (LOVENOX) injection 40 mg  40 mg Subcutaneous Daily Alex Vences MD   40 mg at 02/07/20 0810    potassium chloride (KLOR-CON M) extended release tablet 40 mEq  40 mEq Oral PRN Alex Vences MD        Or    potassium bicarb-citric acid (EFFER-K) effervescent tablet 40 mEq  40 mEq Oral PRN Alex Vences MD        Or    potassium chloride 10 mEq/100 mL IVPB (Peripheral Line)  10 mEq Intravenous PRN Alex Vences  mL/hr at 02/04/20 1716 10 mEq at 02/04/20 1716    ipratropium-albuterol (DUONEB) nebulizer solution 1 ampule  1 ampule Inhalation Q4H PRN Barbara Escobar MD   1 ampule at 02/01/20 1217    docusate sodium (COLACE) capsule 100 mg  100 mg Oral Daily Barbara Escobar MD   100 mg at 02/07/20 1028    fluticasone-umeclidin-vilant (TRELEGY ELLIPTA) 100-62.5-25 MCG/INH inhaler 1 puff  1 puff Inhalation Daily Barbara Escobar MD   1 puff at 02/07/20 1038    montelukast (SINGULAIR) tablet 10 mg  10 mg Oral Nightly Barbara Escobar MD   10 mg at 02/06/20 2157    pantoprazole (PROTONIX) tablet 40 mg  40 mg Oral QAM AC Barbara Escobar MD   40 mg at 02/07/20 9135    magnesium citrate solution 296 mL  296 mL Oral Once Barbara Escobar MD        0.9 % sodium chloride infusion   Intravenous Continuous Barbara Escobar  mL/hr at 02/07/20 1153      ipratropium-albuterol (DUONEB) nebulizer solution 1 ampule  1 ampule Inhalation Q4H WA Martha Cohen MD   1 ampule at 02/07/20 1005    hydrOXYzine (ATARAX) tablet 25 mg  25 mg Oral TID CARLSO Edge   25 mg at 02/07/20 1029        Labs:     Recent Labs     02/05/20  0440 02/06/20  0427 02/07/20  0424   WBC 17.9* 20.1* 19.9*   RBC 3.74* 3.76* 3.78*   HGB 11.1* 11.3* 11.3*   HCT 35.1* 35.1* 35.4*   MCV 93.9 93.4 93.7   MCH 29.7 30.1 29.9   MCHC 31.6* 32.2* 31.9*    220 206     Recent Labs     02/05/20  0440 02/06/20  0427 02/07/20  0424    143 143   K 3.9 3.6 3.5   ANIONGAP 12 13 11    103 101   CO2 29 27 31*   BUN 17 21 21   CREATININE 0.5 <0.5 <0.5   GLUCOSE 135* 141* 141*   CALCIUM 8.4* 8.3* 8.5*     No results for input(s): MG, PHOS in the last 72 hours. No results for input(s): AST, ALT, ALB, BILITOT, ALKPHOS, ALB in the last 72 hours. ABGs:No results for input(s): PH, PO2, PCO2, HCO3, BE, O2SAT in the last 72 hours. Troponin T:   No results for input(s): TROPONINI in the last 72 hours.   INR: No results for input(s):

## 2020-02-07 NOTE — PLAN OF CARE
Problem: Falls - Risk of:  Goal: Will remain free from falls  Description  Will remain free from falls  2/6/2020 1634 by Lynda Aden RN  Outcome: Met This Shift  Goal: Absence of physical injury  Description  Absence of physical injury  2/6/2020 1634 by Lynda Aden RN  Outcome: Met This Shift     Problem:  Activity:  Goal: Fatigue will decrease  Description  Fatigue will decrease  2/6/2020 1634 by Lynda Aden RN  Outcome: Ongoing  Goal: Ability to tolerate increased activity will improve  Description  Ability to tolerate increased activity will improve  2/6/2020 1634 by Lynda Aden RN  Outcome: Not Met This Shift     Problem: Cardiac:  Goal: Hemodynamic stability will improve  Description  Hemodynamic stability will improve  2/6/2020 1634 by Lynda Aden RN  Outcome: Met This Shift     Problem: Coping:  Goal: Level of anxiety will decrease  Description  Level of anxiety will decrease  2/6/2020 1634 by Lynda Aden RN  Outcome: Met This Shift  Goal: Ability to cope will improve  Description  Ability to cope will improve  2/6/2020 1634 by Lynda Aden RN  Outcome: Met This Shift  Goal: Ability to establish a method of communication will improve  Description  Ability to establish a method of communication will improve  2/6/2020 1634 by Lynda Aden RN  Outcome: Met This Shift     Problem: Coping:  Goal: Level of anxiety will decrease  Description  Level of anxiety will decrease  2/6/2020 1634 by Lynda Aden RN  Outcome: Met This Shift  Goal: Ability to cope will improve  Description  Ability to cope will improve  2/6/2020 1634 by Lynda Aden RN  Outcome: Met This Shift  Goal: Ability to establish a method of communication will improve  Description  Ability to establish a method of communication will improve  2/6/2020 1634 by Lynda Aden RN  Outcome: Met This Shift     Problem: Nutritional:  Goal: Consumption of the prescribed amount of daily

## 2020-02-07 NOTE — CARE COORDINATION
Spoke with Margaret Salazar with [facility] ([phone]). All eligible benefits are within a 110 page document located at [facility]. Per this document, patient is eligible for Home Health. Patient has an assigned , Jody Severin ([phone]). Called Dr. Dominic Redd's office to establish patient. Their office is not accepting Medicaid patients.  directed this CM to Dr. Kate Ochoa's office. Follow-up appointment scheduled for 2/12/2020 11:20. Message left with nurse to learn if MD will follow patient with Home Health until initial visit. Awaiting callback.   Electronically signed by Jey Little RN on 2/7/2020 at 11:00 AM

## 2020-02-07 NOTE — PROGRESS NOTES
Pulmonary and Critical Care Progress Note [facility]    Patient: Ross Shah  1955   MR# [id number]   Acct# [de-identified]  02/07/20   11:25 AM  Referring Provider: Ivelisse Ornelas DO    Chief Complaint: COPD    Interval history: He is lying in the bed with his Trilogy mask on. He has been on 4L NC with O2 sat of 95% during the day. He has shortness of breath that is improving and alleviated with rest, oxygen, and trilogy machine. No new complaints. No family at the bedside. Medications: sodium chloride flush, 10 mL, Intravenous, 2 times per day    lisinopril, 10 mg, Oral, Daily    doxycycline (VIBRAMYCIN) IV, 100 mg, Intravenous, Q12H    methylPREDNISolone, 40 mg, Intravenous, Q12H    enoxaparin, 40 mg, Subcutaneous, Daily    docusate sodium, 100 mg, Oral, Daily    fluticasone-umeclidin-vilant, 1 puff, Inhalation, Daily    montelukast, 10 mg, Oral, Nightly    pantoprazole, 40 mg, Oral, QAM AC    magnesium citrate, 296 mL, Oral, Once    ipratropium-albuterol, 1 ampule, Inhalation, Q4H WA    hydrOXYzine, 25 mg, Oral, TID   Review of Systems: Review of Systems   Constitutional: Negative for fever. HENT: Positive for congestion. Respiratory: Positive for cough, shortness of breath (improving ) and wheezing. Negative for choking. Gastrointestinal: Positive for constipation. Negative for diarrhea, nausea and vomiting. Neurological: Positive for weakness. Physical Exam:  Blood pressure 130/80, pulse 86, temperature 98.4 °F (36.9 °C), temperature source Temporal, resp. rate 22, height 6' 1\" (1.854 m), weight 123 lb 3 oz (55.9 kg), SpO2 98 %. Intake/Output Summary (Last 24 hours) at 2/7/2020 1125  Last data filed at 2/7/2020 1015  Gross per 24 hour   Intake 1178 ml   Output 3000 ml   Net -1822 ml     Physical Exam  Constitutional:       General: He is not in acute distress. Appearance: He is well-developed. He is ill-appearing. Interventions: Nasal cannula in place. HENT:      Head: Normocephalic and atraumatic. Neck:      Trachea: No tracheal deviation. Cardiovascular:      Rate and Rhythm: Normal rate and regular rhythm. Heart sounds: Normal heart sounds. No murmur. No friction rub. Pulmonary:      Effort: Pulmonary effort is normal. No accessory muscle usage or respiratory distress. Breath sounds: Normal breath sounds. No rales. Comments: Trilogy mask in place   Chest:      Chest wall: No tenderness. Abdominal:      General: Bowel sounds are normal. There is no distension. Palpations: Abdomen is soft. Tenderness: There is no abdominal tenderness. Neurological:      Mental Status: He is alert. Recent Labs     02/05/20  0440 02/06/20  0427 02/07/20  0424   WBC 17.9* 20.1* 19.9*   RBC 3.74* 3.76* 3.78*   HGB 11.1* 11.3* 11.3*   HCT 35.1* 35.1* 35.4*    220 206   MCV 93.9 93.4 93.7   MCH 29.7 30.1 29.9   MCHC 31.6* 32.2* 31.9*   RDW 15.7* 15.7* 15.9*      Recent Labs     02/04/20  1125 02/04/20  1143  02/05/20  0440 02/06/20  0427 02/07/20  0424   NA  --   --   --  143 143 143   K 3.3  --    < > 3.9 3.6 3.5   CL  --   --   --  102 103 101   CO2  --   --   --  29 27 31*   BUN  --   --   --  17 21 21   CREATININE  --   --   --  0.5 <0.5 <0.5   CALCIUM  --   --   --  8.4* 8.3* 8.5*   GLUCOSE  --   --   --  135* 141* 141*   PHART 7.420  --   --   --   --   --    BES3QFD 44.0  --   --   --   --   --    PO2ART 123.0*  --   --   --   --   --    GZF4XJK 28.5*  --   --   --   --   --    L4USFFIB 96.3  --   --   --   --   --    BEART 3.5*  --   --   --   --   --    MG  --  1.9  --   --   --   --     < > = values in this interval not displayed. Pulmonary Assessment:    1. COPD exacerbation  2. Leukocytosis  3. Alpha-1 antitrypsin deficiency  4. Anxiety better    Recommend:     · It appears the plan at this time is for the patient to remain in [place] with his sister. He will have [facility].  Case management will need to help him make arrangements to obtain his [place] infusions here in [place]. · Nothing else to offer from an alpha-1 antitrypsin deficiency or COPD standpoint other than a lung transplant. However, he would need to be able to participate in pulmonary rehab or therapy to be a candidate for this. · Continue current therapies for now  · No change in recommendations and pulmonology will sign off. Electronically signed by Shama Lindsay on 2/7/2020 at 11:25 AM       Physician Substantive portion:  He has no new complaints today. He is in no distress and using his trilogy machine. He is awake. Breath sounds are diminished. He is barrel chested and somewhat cachectic. This is all stable. I will go and transition him to oral prednisone. From this point forward okay to work towards getting him home either to family here in [place] or back to his home in [place]. He will need to resume all of his home cares that he has been receiving. We will follow him up as needed in the office, particularly should he end up relocating and staying in this area. I have seen and examined patient personally, performing a face-to-face diagnostic evaluation with plan of care reviewed and developed with APRN and nursing staff. I have addended and/or modified the above history of present illness, physical examination, and assessment and plan to reflect my findings and impressions. Essential elements of the care plan were discussed with APRN above. Agree with findings and assessment/plan as documented above.     Electronically signed by Adelina Boothe on 2/7/2020, 11:38 AM

## 2020-02-08 PROBLEM — A41.9 SEPSIS (HCC): Status: RESOLVED | Noted: 2020-01-01 | Resolved: 2020-01-01

## 2020-02-08 NOTE — DISCHARGE SUMMARY
hospitalized at home in [place], discharged to SNF but his sister brought him to her house after 3 days due to concerns with the care he was receiving. Lactic acid elevated, given IV fluids and admitted on azithromycin and ceftriaxone. Pulmonology consult, added hydroxyzine and increased corticosteroid treatment. 2-2: Feeling very weak. Some chest pain, echo ordered. Antibiotics changed to doxycycline. 2-3: Cardiology consulted for chest pain, echo ordered. Case management consulted. Patient felt to need SNF placement but not agreeable and has insurance still in [place]. 2-4: Patient breathing heavily with apparent panic attack. Diltiazem discontinued for ACEI.  2-5: Breathing comfortably and improved. 2-6: Patient has decided on eventual discharge to stay with his sister without hospice services. Case management working on the equipment he needs but this may be complicated by his insurance situation. 2-7: Pulmonology has signed off, nothing new to offer. Will need to arrange for alpha-1 antitrypsin deficiency treatment in [place] and will need home health services, which will necessitate change in insurance. Patient's status is stable but he cannot be discharged until home services can be arranged. PT and OT evaluations ordered. 2-8: Worked with physical therapy and ambulated 25' with stand-by assistance. Will discharge to home. Home health is ordered and will go into effect when the patient is able to switch insurance to [place]. The patient is a hospice candidate but is not willing to pursue that at this time. Will discharge on prolonged prednisone taper with 5 more days of doxycycline.     Physical Exam:  Vitals: /79   Pulse 106   Temp 97 °F (36.1 °C) (Temporal)   Resp 21   Ht 6' 1\" (1.854 m)   Wt 123 lb 3 oz (55.9 kg)   SpO2 99%   BMI 16.25 kg/m²   24HR INTAKE/OUTPUT:    No intake or output data in the 24 hours ending 02/11/20 1414  General appearance: alert and cooperative with exam  HEENT: atraumatic, eyes with clear conjunctiva and normal lids, pupils and irises normal, external ears and nose are normal, lips normal. Neck without masses, lympadenopathy, bruit, thyroid normal  Lungs: no increased work of breathing, diminished breath sounds bilaterally and wheezes bilaterally  Heart: tachycardic but regular, no murmurs  Abdomen: soft, non-tender; bowel sounds normal; no masses,  no organomegaly  Extremities: extremities normal without clubbing, atraumatic, no cyanosis or edema  Neurologic: no focal neurologic deficits, normal sensation, alert and oriented, affect and mood appropriate  Skin: no jaundice, rashes, or nodules    Discharge Medications:       Harsh Marie   Home Medication Instructions XIMENA:[id number]    Printed on:02/11/20 1414   Medication Information                      albuterol-ipratropium (COMBIVENT RESPIMAT)  MCG/ACT AERS inhaler  Combivent Respimat 20 mcg-100 mcg/actuation solution for inhalation   INHALE 1 PUFF BY MOUTH 4 TIMES A DAY             docusate sodium (COLACE) 100 MG capsule  Take 100 mg by mouth daily             doxycycline hyclate (VIBRA-TABS) 100 MG tablet  Take 1 tablet by mouth 2 times daily for 5 days             fluticasone-umeclidin-vilant (TRELEGY ELLIPTA) 100-62.5-25 MCG/INH AEPB  Trelegy Ellipta 100 mcg-62.5 mcg-25 mcg powder for inhalation   INHALE 1 PUFF BY MOUTH EVERY DAY             hydrOXYzine (ATARAX) 25 MG tablet  Take 1 tablet by mouth 3 times daily             ipratropium-albuterol (DUONEB) 0.5-2.5 (3) MG/3ML SOLN nebulizer solution  ipratropium 0.5 mg-albuterol 3 mg (2.5 mg base)/3 mL nebulization soln   USE 1 VIAL IN NEBULIZER 4 TIMES A DAY. J44.9             lisinopril (PRINIVIL;ZESTRIL) 10 MG tablet  Take 1 tablet by mouth daily             montelukast (SINGULAIR) 10 MG tablet  montelukast 10 mg tablet   TAKE 1 TABLET BY MOUTH EVERY DAY             omeprazole (PRILOSEC) 20 MG delayed release capsule  omeprazole 20 mg

## 2020-02-08 NOTE — PLAN OF CARE
Rin Wyatt LPN  Outcome: Ongoing  2/7/2020 1609 by Lizzie Patel RN  Outcome: Met This Shift     Problem: Breathing Pattern - Ineffective:  Goal: Ability to achieve and maintain a regular respiratory rate will improve  Description  Ability to achieve and maintain a regular respiratory rate will improve  2/8/2020 0302 by Lexi Farrell LPN  Outcome: Ongoing  2/8/2020 0000 by Lexi Farrell LPN  Outcome: Ongoing  2/7/2020 1609 by Lizzie Patel RN  Outcome: Met This Shift     Problem: Safety:  Goal: Free from accidental physical injury  Description  Free from accidental physical injury  2/8/2020 0302 by Lexi Farrell LPN  Outcome: Ongoing  2/8/2020 0000 by Lexi Farrell LPN  Outcome: Ongoing  2/7/2020 1609 by Lizzie Patel RN  Outcome: Met This Shift     Problem: Daily Care:  Goal: Daily care needs are met  Description  Daily care needs are met  2/8/2020 0302 by Lexi Farrell LPN  Outcome: Ongoing  2/8/2020 0000 by Lexi Farrell LPN  Outcome: Ongoing  2/7/2020 1609 by Lizzie Patel RN  Outcome: Met This Shift     Problem: Pain:  Goal: Patient's pain/discomfort is manageable  Description  Patient's pain/discomfort is manageable  2/8/2020 0302 by Lexi Farrell LPN  Outcome: Ongoing  2/8/2020 0000 by Lexi Farrell LPN  Outcome: Ongoing  2/7/2020 1609 by Lizzie Patel RN  Outcome: Met This Shift  Goal: Pain level will decrease  Description  Pain level will decrease  2/8/2020 0302 by Lexi Farrell LPN  Outcome: Ongoing  2/8/2020 0000 by Lexi Farrell LPN  Outcome: Ongoing  2/7/2020 1609 by Lizzie Patel RN  Outcome: Met This Shift  Goal: Control of acute pain  Description  Control of acute pain  2/8/2020 0302 by Lexi Farrell LPN  Outcome: Ongoing  2/8/2020 0000 by Lexi Farrell LPN  Outcome: Ongoing  2/7/2020 1609 by Lizzie Patel RN  Outcome: Met This Shift  Goal: Control of chronic pain  Description  Control of chronic pain  2/8/2020 0302 by Dallin Lomeli LPN  Outcome: Ongoing  2/8/2020 0000 by Dallin Lomeli LPN  Outcome: Ongoing  2/7/2020 1609 by Juan Rubio RN  Outcome: Met This Shift     Problem: Skin Integrity:  Goal: Skin integrity will stabilize  Description  Skin integrity will stabilize  2/8/2020 0302 by Dallin Lomeli LPN  Outcome: Ongoing  2/8/2020 0000 by Dlalin Lomeli LPN  Outcome: Ongoing  2/7/2020 1609 by Juan Rubio RN  Outcome: Met This Shift     Problem: Discharge Planning:  Goal: Patients continuum of care needs are met  Description  Patients continuum of care needs are met  2/8/2020 0302 by Dallin Lomeli LPN  Outcome: Ongoing  2/8/2020 0000 by Dallin Lomeli LPN  Outcome: Ongoing  2/7/2020 1609 by Juan Rubio RN  Outcome: Ongoing     Problem: ABCDS Injury Assessment  Goal: Absence of physical injury  2/8/2020 0302 by Dallin Lomeli LPN  Outcome: Ongoing  2/8/2020 0000 by Dallin Lomeli LPN  Outcome: Ongoing  2/7/2020 1609 by Juan Rubio RN  Outcome: Met This Shift     Problem: Discharge Planning:  Goal: Discharged to appropriate level of care  Description  Discharged to appropriate level of care  2/8/2020 0302 by Dallin Lomeli LPN  Outcome: Ongoing  2/8/2020 0000 by Dallin Lomeli LPN  Outcome: Ongoing  2/7/2020 1609 by Juan Rubio RN  Outcome: Ongoing     Problem: Risk for Impaired Skin Integrity  Goal: Tissue integrity - skin and mucous membranes  Description  Structural intactness and normal physiological function of skin and  mucous membranes.   2/8/2020 0302 by Dallin Lomeli LPN  Outcome: Ongoing  2/8/2020 0000 by Dallin Lomeli LPN  Outcome: Ongoing  2/7/2020 1609 by Juan Rubio RN  Outcome: Met This Shift     Problem: Nutrition  Goal: Optimal nutrition therapy  Description  Nutrition Problem: Severe malnutrition, In context of chronic illness  Intervention: Food and/or Nutrient Delivery: Continue current diet, Start ONS  Nutritional Goals: PO 50% or more, wt stable or gain     2/8/2020 0302 by Abbi Lovelace LPN  Outcome: Ongoing  2/8/2020 0000 by Abbi Lovelace LPN  Outcome: Ongoing  2/7/2020 1609 by Alessio Lindsay RN  Outcome: Met This Shift  2/7/2020 1603 by Shirley Alvarado, MS, RD, LD  Outcome: Ongoing

## 2020-02-08 NOTE — PROGRESS NOTES
Physical Therapy  Mayra Land  [id number]     02/08/20 1151   Subjective   Subjective Patient agreed to work with therapy but needed encouragement from nursing. Bed Mobility   Supine to Sit Contact guard assistance   Sit to Supine Stand by assistance   Other Activities   Comment Patient initially declined therapy and then agreed with ecouragement from nursing. Patient able to sit EOB with CGA. Once sitting, patient states he is having trouble breathing and needs to sit for a few minutes. Patient given ample time to sit for recovery. Patient then states he has to lie down secodnary to breathing and not able to go to the chair at this time. Patient able to transfer sit to supine with SBA, returned to supine, positioned for comfort with all needs in reach.    Short term goals   Time Frame for Short term goals 2 wks   Short term goal 1 supine to sit indep   Short term goal 2 sit to stand indep   Short term goal 3 amb. 22' with RW SBA   Short term goal 4 bed to chair indep   Activity Tolerance   Activity Tolerance Patient limited by endurance   Electronically signed by Jared Hirsch PTA on 2/8/2020 at 11:57 AM

## 2020-02-11 PROBLEM — J96.11 CHRONIC RESPIRATORY FAILURE WITH HYPOXIA (HCC): Status: ACTIVE | Noted: 2020-01-01

## 2020-03-30 PROBLEM — J96.21 ACUTE ON CHRONIC RESPIRATORY FAILURE WITH HYPOXIA AND HYPERCAPNIA (HCC): Status: ACTIVE | Noted: 2020-01-01

## 2020-03-30 PROBLEM — J96.22 ACUTE ON CHRONIC RESPIRATORY FAILURE WITH HYPOXIA AND HYPERCAPNIA (HCC): Status: ACTIVE | Noted: 2020-01-01
